# Patient Record
Sex: FEMALE | Race: BLACK OR AFRICAN AMERICAN | Employment: FULL TIME | URBAN - METROPOLITAN AREA
[De-identification: names, ages, dates, MRNs, and addresses within clinical notes are randomized per-mention and may not be internally consistent; named-entity substitution may affect disease eponyms.]

---

## 2017-01-04 ENCOUNTER — OFFICE VISIT (OUTPATIENT)
Dept: FAMILY MEDICINE CLINIC | Facility: CLINIC | Age: 42
End: 2017-01-04

## 2017-01-04 ENCOUNTER — TELEPHONE (OUTPATIENT)
Dept: FAMILY MEDICINE CLINIC | Facility: CLINIC | Age: 42
End: 2017-01-04

## 2017-01-04 DIAGNOSIS — J01.01 ACUTE RECURRENT MAXILLARY SINUSITIS: Primary | ICD-10-CM

## 2017-01-04 DIAGNOSIS — E04.2 NONTOXIC MULTINODULAR GOITER: ICD-10-CM

## 2017-01-04 DIAGNOSIS — J30.1 SEASONAL ALLERGIC RHINITIS DUE TO POLLEN: ICD-10-CM

## 2017-01-04 DIAGNOSIS — G43.009 MIGRAINE WITHOUT AURA AND WITHOUT STATUS MIGRAINOSUS, NOT INTRACTABLE: ICD-10-CM

## 2017-01-04 PROCEDURE — 99214 OFFICE O/P EST MOD 30 MIN: CPT | Performed by: FAMILY MEDICINE

## 2017-01-04 RX ORDER — MONTELUKAST SODIUM 10 MG/1
10 TABLET ORAL DAILY
Qty: 30 TABLET | Refills: 1 | Status: SHIPPED | OUTPATIENT
Start: 2017-01-04 | End: 2017-02-03

## 2017-01-04 RX ORDER — CEFUROXIME AXETIL 500 MG/1
500 TABLET ORAL 2 TIMES DAILY
Qty: 20 TABLET | Refills: 0 | Status: SHIPPED | OUTPATIENT
Start: 2017-01-04 | End: 2017-01-14

## 2017-01-04 NOTE — TELEPHONE ENCOUNTER
Patient c/o new sinus infection that  Started this Friday , she is having sinus pressure, throat pain,running nose no fever, but has been taking ibuprofen for throat pain, she was seen this for this same symptom in November, she is asking if  can call in

## 2017-01-05 NOTE — PROGRESS NOTES
Katheryn Forrest is a 35year old female. HPI:  Has been feeling sick for 4-5 days. Sinus congestion/sinus pressure and headaches. sxs similar to last sinus infection about 2 months ago.  Did get better after last ov w/ oral abx course and then sxs recurre exertion  GI: denies abdominal pain and denies heartburn  : normal bladder  NEURO: denies headaches, denies dizziness    EXAM:  /62 mmHg  Pulse 82  Temp(Src) 97.8 °F (36.6 °C) (Oral)  Resp 16  Ht 62\"  Wt 161 lb  BMI 29.44 kg/m2  SpO2 98%  LMP 12/3 poss triggers, sleep hygiene  OTC meds prn help

## 2017-01-07 VITALS
BODY MASS INDEX: 29.63 KG/M2 | RESPIRATION RATE: 16 BRPM | HEIGHT: 62 IN | OXYGEN SATURATION: 98 % | SYSTOLIC BLOOD PRESSURE: 100 MMHG | WEIGHT: 161 LBS | TEMPERATURE: 98 F | DIASTOLIC BLOOD PRESSURE: 62 MMHG | HEART RATE: 82 BPM

## 2017-01-07 PROBLEM — G43.009 MIGRAINE WITHOUT AURA AND WITHOUT STATUS MIGRAINOSUS, NOT INTRACTABLE: Status: ACTIVE | Noted: 2017-01-07

## 2017-01-20 ENCOUNTER — TELEPHONE (OUTPATIENT)
Dept: FAMILY MEDICINE CLINIC | Facility: CLINIC | Age: 42
End: 2017-01-20

## 2017-01-20 NOTE — TELEPHONE ENCOUNTER
Left voice message for patient regarding need for more information on what her symptoms are, advised that clinic is closing and if her symptoms do not improve to seek urgent care.

## 2017-01-20 NOTE — TELEPHONE ENCOUNTER
Pt wants to speak to nurse. She wants nurse to know that she has Thyroid issues & wants to know if her thyroid issue could be playing a part into why she is not getting better. Pt is going back into work. Ok to leave a voicemail.  Did not want to make appo

## 2017-01-23 NOTE — TELEPHONE ENCOUNTER
Spoke with patient regarding her continue throat issues and advised appointment for check up she scheduled appointment for Wednesday with Dr Martin Serrano.

## 2017-01-25 ENCOUNTER — OFFICE VISIT (OUTPATIENT)
Dept: FAMILY MEDICINE CLINIC | Facility: CLINIC | Age: 42
End: 2017-01-25

## 2017-01-25 VITALS
BODY MASS INDEX: 29.22 KG/M2 | HEIGHT: 62 IN | OXYGEN SATURATION: 98 % | DIASTOLIC BLOOD PRESSURE: 62 MMHG | SYSTOLIC BLOOD PRESSURE: 100 MMHG | WEIGHT: 158.81 LBS | RESPIRATION RATE: 16 BRPM | TEMPERATURE: 98 F | HEART RATE: 73 BPM

## 2017-01-25 DIAGNOSIS — E04.2 NONTOXIC MULTINODULAR GOITER: ICD-10-CM

## 2017-01-25 DIAGNOSIS — J30.89 ALLERGIC RHINITIS DUE TO OTHER ALLERGIC TRIGGER, UNSPECIFIED RHINITIS SEASONALITY: ICD-10-CM

## 2017-01-25 DIAGNOSIS — S33.5XXA LUMBAR SPRAIN, INITIAL ENCOUNTER: ICD-10-CM

## 2017-01-25 DIAGNOSIS — H65.91 MIDDLE EAR EFFUSION, RIGHT: Primary | ICD-10-CM

## 2017-01-25 DIAGNOSIS — M77.8 LEFT ELBOW TENDINITIS: ICD-10-CM

## 2017-01-25 DIAGNOSIS — Z00.00 LABORATORY EXAMINATION ORDERED AS PART OF A COMPLETE PHYSICAL EXAMINATION: ICD-10-CM

## 2017-01-25 DIAGNOSIS — J06.9 VIRAL URI: ICD-10-CM

## 2017-01-25 PROCEDURE — 99214 OFFICE O/P EST MOD 30 MIN: CPT | Performed by: FAMILY MEDICINE

## 2017-01-25 NOTE — PROGRESS NOTES
Nehemiah Landeros is a 35year old female. HPI:  Pt states was feeling better after last ov and abx course and but sxs not completely resolved  Took Ceftin course for 10 days, but noted med caused sedation. prefers not to have this abx again due to this SE,                 REVIEW OF SYSTEMS:  GENERAL HEALTH: feels well otherwise, mood is good  SKIN: denies any unusual skin lesions or rashes  RESPIRATORY: denies shortness of breath with exertion, no cough  CARDIOVASCULAR: denies chest pain on exertio daily  Pt does not want any nasal sprays. Discussed intranasal steroid indication, should help ear effusions, but pt had SEs w/ Flonase in past and does not want any intranasal meds    4.  Nontoxic multinodular goiter  Pt w/ symptomatic increase in thyroid

## 2017-01-31 ENCOUNTER — TELEPHONE (OUTPATIENT)
Dept: FAMILY MEDICINE CLINIC | Facility: CLINIC | Age: 42
End: 2017-01-31

## 2017-01-31 DIAGNOSIS — M54.50 ACUTE BILATERAL LOW BACK PAIN WITHOUT SCIATICA: Primary | ICD-10-CM

## 2017-01-31 DIAGNOSIS — M25.522 ELBOW PAIN, LEFT: ICD-10-CM

## 2017-01-31 NOTE — TELEPHONE ENCOUNTER
Pt called asking to speak to nurse in regard to new med (anti-inflammatory) just was just given. Pt said it is not working.

## 2017-02-01 NOTE — TELEPHONE ENCOUNTER
Patient states the back pain is pretty much around the clock, mostly when she moves a certain way, and still has elbow pain, but is not worse, she has been taking ibuprofen 6-8, she use brace sometimes but admits to not following 's advice regarding her

## 2017-02-01 NOTE — TELEPHONE ENCOUNTER
Please triage regarding pt's symptoms. Saw her for multiple issues at last ov. Diclofenac was for back and elbow pain. How are sinus sxs? How are her joint symptoms, what is still hurting?, ? Better, ? Any worse.  Is she needing meds pretty much around the

## 2017-02-01 NOTE — TELEPHONE ENCOUNTER
Please let pt know that she needs to follow advice re: back and elbow pain tx w/ local ice/heat, exercises   Can cont Ibuprofen as needed with food, but if notes any GI SEs should stop medication. I entered an order for back xray and elbow xray.    If she

## 2017-02-01 NOTE — TELEPHONE ENCOUNTER
Patient states that the diclofenac did not work she states she started taking ibuprofen again, 400 mg every 6 hours, she stated she did not want anything that would make her drowsy or put her to sleep, She stated the iburofen does seem to work better, is t

## 2017-03-31 ENCOUNTER — HOSPITAL ENCOUNTER (OUTPATIENT)
Dept: ULTRASOUND IMAGING | Age: 42
Discharge: HOME | End: 2017-03-31
Attending: FAMILY MEDICINE
Payer: COMMERCIAL

## 2017-03-31 DIAGNOSIS — E04.2 NONTOXIC MULTINODULAR GOITER: ICD-10-CM

## 2017-03-31 PROCEDURE — 76536 US EXAM OF HEAD AND NECK: CPT

## 2017-04-01 ENCOUNTER — LAB ENCOUNTER (OUTPATIENT)
Dept: LAB | Age: 42
End: 2017-04-01
Attending: FAMILY MEDICINE
Payer: COMMERCIAL

## 2017-04-01 DIAGNOSIS — Z00.00 LABORATORY EXAMINATION ORDERED AS PART OF A COMPLETE PHYSICAL EXAMINATION: ICD-10-CM

## 2017-04-01 DIAGNOSIS — E04.2 NONTOXIC MULTINODULAR GOITER: ICD-10-CM

## 2017-04-01 PROCEDURE — 82306 VITAMIN D 25 HYDROXY: CPT

## 2017-04-01 PROCEDURE — 36415 COLL VENOUS BLD VENIPUNCTURE: CPT

## 2017-04-01 PROCEDURE — 84439 ASSAY OF FREE THYROXINE: CPT

## 2017-04-01 PROCEDURE — 80061 LIPID PANEL: CPT

## 2017-04-01 PROCEDURE — 85025 COMPLETE CBC W/AUTO DIFF WBC: CPT

## 2017-04-01 PROCEDURE — 84443 ASSAY THYROID STIM HORMONE: CPT

## 2017-04-01 PROCEDURE — 80053 COMPREHEN METABOLIC PANEL: CPT

## 2017-04-07 ENCOUNTER — TELEPHONE (OUTPATIENT)
Dept: FAMILY MEDICINE CLINIC | Facility: CLINIC | Age: 42
End: 2017-04-07

## 2017-04-07 DIAGNOSIS — E04.2 MULTINODULAR GOITER: ICD-10-CM

## 2017-04-07 DIAGNOSIS — D72.818 OTHER DECREASED WHITE BLOOD CELL (WBC) COUNT: Primary | ICD-10-CM

## 2017-04-07 RX ORDER — ERGOCALCIFEROL 1.25 MG/1
50000 CAPSULE ORAL WEEKLY
Qty: 4 CAPSULE | Refills: 2 | Status: SHIPPED | OUTPATIENT
Start: 2017-04-07 | End: 2017-05-07

## 2017-04-07 NOTE — TELEPHONE ENCOUNTER
Pt notified of labs and u/s results when she was in office w/ her dtr this AM.  WBC low at 2.8. Hb and Plt counts normal. Advised f/u w/ Hematology. , pt states she was not fasting, wnl. Lipids w/ normal TG and LDL. HDL sl low at 39.  Exercise regula

## 2017-04-07 NOTE — TELEPHONE ENCOUNTER
Left voice message for patient per hippa that prescriptions were sent to pharmacy, given telephone numbers for referral along with physicians names, and that she is due for a physical next month, to schedule with Dr Don Godfrey.

## 2017-10-16 ENCOUNTER — OFFICE VISIT (OUTPATIENT)
Dept: FAMILY MEDICINE CLINIC | Facility: CLINIC | Age: 42
End: 2017-10-16

## 2017-10-16 VITALS
WEIGHT: 160.25 LBS | TEMPERATURE: 98 F | SYSTOLIC BLOOD PRESSURE: 106 MMHG | OXYGEN SATURATION: 99 % | HEIGHT: 62 IN | HEART RATE: 84 BPM | BODY MASS INDEX: 29.49 KG/M2 | DIASTOLIC BLOOD PRESSURE: 68 MMHG | RESPIRATION RATE: 16 BRPM

## 2017-10-16 DIAGNOSIS — J02.8 ACUTE PHARYNGITIS DUE TO OTHER SPECIFIED ORGANISMS: Primary | ICD-10-CM

## 2017-10-16 PROCEDURE — 99213 OFFICE O/P EST LOW 20 MIN: CPT | Performed by: FAMILY MEDICINE

## 2017-10-16 RX ORDER — AMOXICILLIN 875 MG/1
875 TABLET, COATED ORAL 2 TIMES DAILY
Qty: 20 TABLET | Refills: 0 | Status: SHIPPED | OUTPATIENT
Start: 2017-10-16 | End: 2017-10-26

## 2017-10-17 NOTE — PROGRESS NOTES
Patient presents with:  Sore Throat: 1 week .  ibuprofen / cold meds   Ear Pain: R ear  Chest Congestion  Sinus Problem      HPI:   Katheryn Forrest is a 35year old female came here with a complaint of cough and cold, for few days  Patient has been having Maternal Grandmother      70's   • Diabetes Paternal Grandfather    • Other[other] [OTHER] Paternal Grandfather    • lung cancer[other] [OTHER] Maternal Grandfather    • Asthma Other       Smoking status: Never Smoker for this Visit:  Signed Prescriptions Disp Refills    amoxicillin 875 MG Oral Tab 20 tablet 0      Sig: Take 1 tablet (875 mg total) by mouth 2 (two) times daily.            Imaging & Consults:  None

## 2018-02-28 ENCOUNTER — OFFICE VISIT (OUTPATIENT)
Dept: FAMILY MEDICINE CLINIC | Facility: CLINIC | Age: 43
End: 2018-02-28

## 2018-02-28 DIAGNOSIS — N63.20 LEFT BREAST MASS: ICD-10-CM

## 2018-02-28 DIAGNOSIS — N60.11 FIBROCYSTIC BREAST CHANGES OF BOTH BREASTS: ICD-10-CM

## 2018-02-28 DIAGNOSIS — R13.10 DYSPHAGIA, UNSPECIFIED TYPE: ICD-10-CM

## 2018-02-28 DIAGNOSIS — N60.12 FIBROCYSTIC BREAST CHANGES OF BOTH BREASTS: ICD-10-CM

## 2018-02-28 DIAGNOSIS — Z00.00 LABORATORY EXAMINATION ORDERED AS PART OF A COMPLETE PHYSICAL EXAMINATION: ICD-10-CM

## 2018-02-28 DIAGNOSIS — E55.9 VITAMIN D DEFICIENCY: ICD-10-CM

## 2018-02-28 DIAGNOSIS — Z00.00 ROUTINE PHYSICAL EXAMINATION: Primary | ICD-10-CM

## 2018-02-28 DIAGNOSIS — E04.2 NONTOXIC MULTINODULAR GOITER: ICD-10-CM

## 2018-02-28 DIAGNOSIS — D72.819 LEUKOPENIA, UNSPECIFIED TYPE: ICD-10-CM

## 2018-02-28 PROCEDURE — 99396 PREV VISIT EST AGE 40-64: CPT | Performed by: FAMILY MEDICINE

## 2018-02-28 NOTE — PROGRESS NOTES
Polo Mojica is a 38year old female. HPI:  Pt is here for physical  Has been more sedentary and not compliant w/ healthy eating habits. Some weight gain due to this.   States she is planning to start exercising regularly  Recent URI w/ exposure to ot • Nontoxic multinodular goiter     s/p thyroid bx 2005 & 10/2008, both benign    • Osteoarthrosis, unspecified whether generalized or localized, unspecified site     Right Knee, Spine   • Other chest pain    • Unspecified disorder of thyroid 9/2014    MN murmur  GI: NABS, soft, no tenderness, no masses, no HSM, ND  EXTREMITIES: no cyanosis, clubbing or edema  NEURO: A&O x3, no focal deficits  Normal gait  Breasts: Bilateral dense fibrocystic changes bilaterally, no focal dominant masses noted.   No nipple o Future    6. Dysphagia, unspecified type  - US THYROID ([id number]); Future    7. Leukopenia, unspecified type  Chronic, benign. Monitor  Also needs f/u w/ Hematology, states will consider after labs  - CBC WITH DIFFERENTIAL WITH PLATELET; Future    8.  Nydia

## 2018-03-08 VITALS
RESPIRATION RATE: 18 BRPM | WEIGHT: 164 LBS | SYSTOLIC BLOOD PRESSURE: 112 MMHG | HEIGHT: 62 IN | HEART RATE: 90 BPM | DIASTOLIC BLOOD PRESSURE: 60 MMHG | BODY MASS INDEX: 30.18 KG/M2 | TEMPERATURE: 99 F | OXYGEN SATURATION: 100 %

## 2018-03-17 ENCOUNTER — HOSPITAL ENCOUNTER (OUTPATIENT)
Dept: ULTRASOUND IMAGING | Age: 43
Discharge: HOME | End: 2018-03-17
Attending: FAMILY MEDICINE
Payer: COMMERCIAL

## 2018-03-17 DIAGNOSIS — E04.2 NONTOXIC MULTINODULAR GOITER: ICD-10-CM

## 2018-03-17 DIAGNOSIS — R13.10 DYSPHAGIA, UNSPECIFIED TYPE: ICD-10-CM

## 2018-03-17 PROCEDURE — 76536 US EXAM OF HEAD AND NECK: CPT | Performed by: FAMILY MEDICINE

## 2018-04-02 ENCOUNTER — TELEPHONE (OUTPATIENT)
Dept: LAB | Age: 43
End: 2018-04-02

## 2018-04-02 DIAGNOSIS — E04.2 NONTOXIC MULTINODULAR GOITER: Primary | ICD-10-CM

## 2018-04-02 NOTE — TELEPHONE ENCOUNTER
----- Message from Génesis Uribe MD sent at 4/2/2018  1:02 PM CDT -----  Please notify patient her thyroid ultrasound shows enlarged gland with numerous nodules, overall not significantly changed since prior exam.  Patient has symptomatic goiter, and ne

## 2018-04-04 NOTE — TELEPHONE ENCOUNTER
Left detailed  message on answering machine per pt request with test results and contact info for Dr Guillermina Morataya [phone]. Also Left message on answering machine to get fasting labs done and to schedule appt to review test results. [facility]

## 2018-04-04 NOTE — TELEPHONE ENCOUNTER
Pt called for test results, said she will be at work and it is okay to leave a voicemail with the results

## 2018-04-20 ENCOUNTER — LAB ENCOUNTER (OUTPATIENT)
Dept: LAB | Age: 43
End: 2018-04-20
Attending: FAMILY MEDICINE
Payer: COMMERCIAL

## 2018-04-20 DIAGNOSIS — D72.819 LEUKOPENIA, UNSPECIFIED TYPE: ICD-10-CM

## 2018-04-20 DIAGNOSIS — Z00.00 LABORATORY EXAMINATION ORDERED AS PART OF A COMPLETE PHYSICAL EXAMINATION: ICD-10-CM

## 2018-04-20 DIAGNOSIS — E04.2 NONTOXIC MULTINODULAR GOITER: ICD-10-CM

## 2018-04-20 DIAGNOSIS — E55.9 VITAMIN D DEFICIENCY: ICD-10-CM

## 2018-04-20 PROCEDURE — 82306 VITAMIN D 25 HYDROXY: CPT

## 2018-04-20 PROCEDURE — 80061 LIPID PANEL: CPT

## 2018-04-20 PROCEDURE — 80050 GENERAL HEALTH PANEL: CPT

## 2018-04-20 PROCEDURE — 84439 ASSAY OF FREE THYROXINE: CPT

## 2018-04-20 PROCEDURE — 36415 COLL VENOUS BLD VENIPUNCTURE: CPT

## 2018-04-20 PROCEDURE — 80053 COMPREHEN METABOLIC PANEL: CPT

## 2018-04-20 PROCEDURE — 83036 HEMOGLOBIN GLYCOSYLATED A1C: CPT

## 2018-04-20 PROCEDURE — 85025 COMPLETE CBC W/AUTO DIFF WBC: CPT

## 2018-05-07 ENCOUNTER — TELEPHONE (OUTPATIENT)
Dept: FAMILY MEDICINE CLINIC | Facility: CLINIC | Age: 43
End: 2018-05-07

## 2018-05-31 NOTE — TELEPHONE ENCOUNTER
Pt was to f/u after labs to discuss results  ( Patient also needs to get her fasting labs done then follow-up in office to review results) noted on result note from thyroid u/s imaging.     Can notify pt labs w/ normal thyroid testing; lipids elevated vs pr

## 2018-06-06 ENCOUNTER — TELEPHONE (OUTPATIENT)
Dept: FAMILY MEDICINE CLINIC | Facility: CLINIC | Age: 43
End: 2018-06-06

## 2018-06-06 DIAGNOSIS — E87.6 HYPOKALEMIA: Primary | ICD-10-CM

## 2018-06-06 NOTE — TELEPHONE ENCOUNTER
Pt came in office said she wants to know why she has to do f/u for labs, said she still has not had her f/u appt with [facility] wants to know why it took so long to get a call back re: results.  Pt said she is not able to contact anyone to schedule appt I

## 2018-06-06 NOTE — TELEPHONE ENCOUNTER
Sent letter with test results and all of Dr Astrid Hardwick notes regarding F/U to discuss labs and F/U thyroid

## 2018-06-06 NOTE — TELEPHONE ENCOUNTER
Pt lmsg on our vm to get lab results, called pt back per previous msg to inform office visit needed for lab f/u no answer left vm

## 2018-06-07 NOTE — TELEPHONE ENCOUNTER
s/w pt   Advised on recs for f/u appt to discuss results.  Pt states was unable to schedule appt and would like to review on phone  s/w pt,  labs w/ normal thyroid testing; lipids elevated vs previous  Fasting blood sugar normal but A1C for 3 mo BS average

## 2018-06-21 ENCOUNTER — HOSPITAL ENCOUNTER (OUTPATIENT)
Dept: ULTRASOUND IMAGING | Age: 43
Discharge: HOME | End: 2018-06-21
Attending: FAMILY MEDICINE
Payer: COMMERCIAL

## 2018-06-21 ENCOUNTER — MED REC SCAN ONLY (OUTPATIENT)
Dept: FAMILY MEDICINE CLINIC | Facility: CLINIC | Age: 43
End: 2018-06-21

## 2018-06-21 ENCOUNTER — HOSPITAL ENCOUNTER (OUTPATIENT)
Dept: MAMMOGRAPHY | Age: 43
Discharge: HOME | End: 2018-06-21
Attending: FAMILY MEDICINE
Payer: COMMERCIAL

## 2018-06-21 DIAGNOSIS — N60.11 FIBROCYSTIC BREAST CHANGES OF BOTH BREASTS: ICD-10-CM

## 2018-06-21 DIAGNOSIS — N60.12 FIBROCYSTIC BREAST CHANGES OF BOTH BREASTS: ICD-10-CM

## 2018-06-21 DIAGNOSIS — N63.20 LEFT BREAST MASS: ICD-10-CM

## 2018-06-21 PROCEDURE — 77062 BREAST TOMOSYNTHESIS BI: CPT | Performed by: FAMILY MEDICINE

## 2018-06-21 PROCEDURE — 76641 ULTRASOUND BREAST COMPLETE: CPT | Performed by: FAMILY MEDICINE

## 2018-06-21 PROCEDURE — 77066 DX MAMMO INCL CAD BI: CPT | Performed by: FAMILY MEDICINE

## 2018-07-02 ENCOUNTER — TELEPHONE (OUTPATIENT)
Dept: FAMILY MEDICINE CLINIC | Facility: CLINIC | Age: 43
End: 2018-07-02

## 2018-07-02 NOTE — TELEPHONE ENCOUNTER
Pt called stating she is returning nurse call for test results. Pt asked if nurse leaves her another message to leave her direct # again. Pt said she deleted the original message from nurse.

## 2018-07-03 NOTE — TELEPHONE ENCOUNTER
Notes recorded by Yoni Parra RN on 7/2/2018 at 4:11 PM CDT  Returned call to patient and discussed mammogram findings. Linda Nava will call back to schedule appointment in three months.  Declined to schedule appt. Today.

## 2018-09-26 ENCOUNTER — OFFICE VISIT (OUTPATIENT)
Dept: FAMILY MEDICINE CLINIC | Facility: CLINIC | Age: 43
End: 2018-09-26

## 2018-09-26 VITALS
TEMPERATURE: 98 F | WEIGHT: 162.25 LBS | RESPIRATION RATE: 16 BRPM | HEIGHT: 62 IN | SYSTOLIC BLOOD PRESSURE: 110 MMHG | DIASTOLIC BLOOD PRESSURE: 62 MMHG | BODY MASS INDEX: 29.86 KG/M2 | OXYGEN SATURATION: 98 % | HEART RATE: 82 BPM

## 2018-09-26 DIAGNOSIS — J06.9 VIRAL UPPER RESPIRATORY TRACT INFECTION: ICD-10-CM

## 2018-09-26 DIAGNOSIS — G43.009 MIGRAINE WITHOUT AURA AND WITHOUT STATUS MIGRAINOSUS, NOT INTRACTABLE: ICD-10-CM

## 2018-09-26 DIAGNOSIS — J30.1 SEASONAL ALLERGIC RHINITIS DUE TO POLLEN: ICD-10-CM

## 2018-09-26 DIAGNOSIS — J02.9 SORE THROAT: Primary | ICD-10-CM

## 2018-09-26 DIAGNOSIS — E04.2 NONTOXIC MULTINODULAR GOITER: ICD-10-CM

## 2018-09-26 PROCEDURE — 99214 OFFICE O/P EST MOD 30 MIN: CPT | Performed by: FAMILY MEDICINE

## 2018-09-26 PROCEDURE — 87880 STREP A ASSAY W/OPTIC: CPT | Performed by: FAMILY MEDICINE

## 2018-09-26 RX ORDER — AMOXICILLIN 875 MG/1
875 TABLET, COATED ORAL 2 TIMES DAILY
Qty: 20 TABLET | Refills: 0 | Status: SHIPPED | OUTPATIENT
Start: 2018-09-26 | End: 2018-10-06

## 2018-09-26 NOTE — PROGRESS NOTES
Abdulaziz Pulido is a 38year old female. HPI:  R ear pain and sore throat started today and seems to be getting worse.   Generalized headache  Pressure behind R eye  Is on her menses so this could be related to migraines but feels different than migraine of breath with exertion  CARDIOVASCULAR: denies chest pain on exertion  GI: denies abdominal pain   : normal bladder  NEURO: denies dizziness    EXAM:  /62   Pulse 82   Temp 98.4 °F (36.9 °C) (Temporal)   Resp 16   Ht 62\"   Wt 162 lb 4 oz   SpO2 9 intractable  Patient will take over-the-counter analgesics as needed. Sleep will help. Defers prescription pain medicines. 5. Nontoxic multinodular goiter  Did have follow-up with ENT a few months ago. This is being monitored.   To have thyroid ultras

## 2018-11-13 ENCOUNTER — TELEPHONE (OUTPATIENT)
Dept: FAMILY MEDICINE CLINIC | Facility: CLINIC | Age: 43
End: 2018-11-13

## 2018-11-13 ENCOUNTER — OFFICE VISIT (OUTPATIENT)
Dept: FAMILY MEDICINE CLINIC | Facility: CLINIC | Age: 43
End: 2018-11-13

## 2018-11-13 VITALS — BODY MASS INDEX: 30 KG/M2 | WEIGHT: 163 LBS | RESPIRATION RATE: 16 BRPM | HEIGHT: 62 IN | HEART RATE: 90 BPM

## 2018-11-13 DIAGNOSIS — J30.1 SEASONAL ALLERGIC RHINITIS DUE TO POLLEN: ICD-10-CM

## 2018-11-13 DIAGNOSIS — J01.81 OTHER ACUTE RECURRENT SINUSITIS: Primary | ICD-10-CM

## 2018-11-13 PROCEDURE — 99213 OFFICE O/P EST LOW 20 MIN: CPT | Performed by: FAMILY MEDICINE

## 2018-11-13 RX ORDER — PREDNISONE 20 MG/1
20 TABLET ORAL DAILY
Qty: 5 TABLET | Refills: 0 | Status: SHIPPED | OUTPATIENT
Start: 2018-11-13 | End: 2018-11-18

## 2018-11-13 RX ORDER — CEFUROXIME AXETIL 500 MG/1
500 TABLET ORAL 2 TIMES DAILY
Qty: 20 TABLET | Refills: 0 | Status: SHIPPED | OUTPATIENT
Start: 2018-11-13 | End: 2018-11-23

## 2018-11-13 NOTE — PROGRESS NOTES
Elba He is a 38year old female. HPI:  Patient complains of worsening bilateral ear pain, sore throat, cough for the last few days. Was significantly improved after 9/26/18 office visit. Did take 10-day course of amoxicillin for sinusitis.   Sym MARROW BIOPSY  207    benign   • D&C AFTER DELIVERY  1991    for ectopic- right tube removed   • HEMORRHOIDECTOMY  9/6/2013    Procedure: HEMORRHOIDECTOMY;  Surgeon: José Miguel Wilkinson MD;  Location: [facility] MAIN OR   • HEMORRHOIDECTOMY N/A 9/6/2013    Performed by Maria C Bee, Axetil (CEFTIN) 500 MG Oral Tab; Take 1 tablet (500 mg total) by mouth 2 (two) times daily for 10 days. Dispense: 20 tablet; Refill: 0  Advised Flonase intranasal spray. Reviewed indication.   Patient does not wish to use this, as had side effect of weigh

## 2018-11-13 NOTE — TELEPHONE ENCOUNTER
Would like Dr. Dolores Bailey to call her. Needed to leave to return to work. Had a 12:45 appt. Left office at 1:26 pm.  Co-pay was refunded for appt. Pls Call:  [phone].

## 2018-11-16 ENCOUNTER — TELEPHONE (OUTPATIENT)
Dept: FAMILY MEDICINE CLINIC | Facility: CLINIC | Age: 43
End: 2018-11-16

## 2018-11-16 NOTE — TELEPHONE ENCOUNTER
LVM for patient to give office a call back for condtion update also apologized again for the experience that she had in the office.

## 2019-02-01 ENCOUNTER — TELEPHONE (OUTPATIENT)
Dept: FAMILY MEDICINE CLINIC | Facility: CLINIC | Age: 44
End: 2019-02-01

## 2019-02-01 NOTE — TELEPHONE ENCOUNTER
Pt hit her head on cabinet said she felt sharp pain go down neck and can feel muscle spasm in center of back and neck. Wants to know what she can take for pain?

## 2019-02-01 NOTE — TELEPHONE ENCOUNTER
Head hurts but back mostly hurts. Says she did not hit her back just her head. Thinks her back is in spasm. Did not lose consciousness. Says OTC Ibuprofen and tylenol is not helping. Advised to go to IC or ER. She can get X-rays if needed and pain Rx.   Ca

## 2019-04-30 ENCOUNTER — TELEPHONE (OUTPATIENT)
Dept: FAMILY MEDICINE CLINIC | Facility: CLINIC | Age: 44
End: 2019-04-30

## 2019-04-30 NOTE — TELEPHONE ENCOUNTER
Spoke to patient. Advised there is no blanket recommendation from the CDC to vaccinate everyone who was born prior to [birth year] with a measles vaccine. Explained there are some cases where vaccination or re-vaccination needs to be considered for some adults.

## 2019-04-30 NOTE — TELEPHONE ENCOUNTER
Pt called stating she heard on the news that anyone born before 1988 needs to be re-vaccinated for Measles. Please advise.

## 2019-07-24 ENCOUNTER — OFFICE VISIT (OUTPATIENT)
Dept: FAMILY MEDICINE CLINIC | Facility: CLINIC | Age: 44
End: 2019-07-24

## 2019-07-24 VITALS
DIASTOLIC BLOOD PRESSURE: 66 MMHG | WEIGHT: 172 LBS | BODY MASS INDEX: 31.65 KG/M2 | RESPIRATION RATE: 16 BRPM | SYSTOLIC BLOOD PRESSURE: 110 MMHG | HEIGHT: 62 IN | TEMPERATURE: 98 F | HEART RATE: 80 BPM

## 2019-07-24 DIAGNOSIS — E04.2 NONTOXIC MULTINODULAR GOITER: ICD-10-CM

## 2019-07-24 DIAGNOSIS — Z00.00 LABORATORY EXAMINATION ORDERED AS PART OF A COMPLETE PHYSICAL EXAMINATION: ICD-10-CM

## 2019-07-24 DIAGNOSIS — N76.3 CHRONIC VULVITIS: ICD-10-CM

## 2019-07-24 DIAGNOSIS — Z12.4 SCREENING FOR CERVICAL CANCER: ICD-10-CM

## 2019-07-24 DIAGNOSIS — J30.1 SEASONAL ALLERGIC RHINITIS DUE TO POLLEN: ICD-10-CM

## 2019-07-24 DIAGNOSIS — G43.009 MIGRAINE WITHOUT AURA AND WITHOUT STATUS MIGRAINOSUS, NOT INTRACTABLE: ICD-10-CM

## 2019-07-24 DIAGNOSIS — Z01.419 ROUTINE GYNECOLOGICAL EXAMINATION: Primary | ICD-10-CM

## 2019-07-24 DIAGNOSIS — Z12.31 VISIT FOR SCREENING MAMMOGRAM: ICD-10-CM

## 2019-07-24 PROCEDURE — 99396 PREV VISIT EST AGE 40-64: CPT | Performed by: FAMILY MEDICINE

## 2019-07-24 PROCEDURE — 88175 CYTOPATH C/V AUTO FLUID REDO: CPT | Performed by: FAMILY MEDICINE

## 2019-07-24 PROCEDURE — 87624 HPV HI-RISK TYP POOLED RSLT: CPT | Performed by: FAMILY MEDICINE

## 2019-07-24 RX ORDER — CLOTRIMAZOLE AND BETAMETHASONE DIPROPIONATE 10; .64 MG/G; MG/G
CREAM TOPICAL
Qty: 15 G | Refills: 0 | Status: SHIPPED | OUTPATIENT
Start: 2019-07-24 | End: 2020-02-10

## 2019-07-24 NOTE — PROGRESS NOTES
Denita Bowens is a 37year old female. HPI:  Pt is here for physical/WWE  Has been doing some walking, but not regularly  Eating healthier recently  Wt gain and thinks due to lack of regular healthy diet and regular exercise.   Menses regular, lasting 4 Migraine    • Nontoxic multinodular goiter     s/p thyroid bx 2005 & 10/2008, both benign    • Osteoarthrosis, unspecified whether generalized or localized, unspecified site     Right Knee, Spine   • Other chest pain    • Unspecified disorder of thyroid 9/ Conjunctivae and EOM are normal.   Neck: Normal range of motion. Neck supple. Thyromegaly present. Multinodular goiter, no tenderness   Cardiovascular: Normal rate, regular rhythm and normal heart sounds. No murmur heard.   Pulmonary/Chest: Effort norm Future  - COMP METABOLIC PANEL (14); Future  - LIPID PANEL; Future  - TSH+FREE T4; Future  - HEMOGLOBIN A1C; Future    4. Visit for screening mammogram  - [facility] MARCELINO 2D+3D SCREENING BILAT (CPT=77067/16519); Future    5.  Chronic vulvitis  - clotrimazole-betam

## 2019-07-25 LAB — HPV I/H RISK 1 DNA SPEC QL NAA+PROBE: NEGATIVE

## 2019-08-01 ENCOUNTER — TELEPHONE (OUTPATIENT)
Dept: FAMILY MEDICINE CLINIC | Facility: CLINIC | Age: 44
End: 2019-08-01

## 2019-08-01 NOTE — TELEPHONE ENCOUNTER
Dr. Leroy Shelby,  Please advise    Results are complete- normal    No noted provider comment. OK to notify patient?

## 2019-09-21 ENCOUNTER — LAB ENCOUNTER (OUTPATIENT)
Dept: LAB | Age: 44
End: 2019-09-21
Attending: FAMILY MEDICINE
Payer: COMMERCIAL

## 2019-09-21 DIAGNOSIS — E04.2 NONTOXIC MULTINODULAR GOITER: ICD-10-CM

## 2019-09-21 DIAGNOSIS — Z00.00 LABORATORY EXAMINATION ORDERED AS PART OF A COMPLETE PHYSICAL EXAMINATION: ICD-10-CM

## 2019-09-21 LAB
ALBUMIN SERPL-MCNC: 3.6 G/DL (ref 3.4–5)
ALBUMIN/GLOB SERPL: 1 {RATIO} (ref 1–2)
ALP LIVER SERPL-CCNC: 62 U/L (ref 37–98)
ALT SERPL-CCNC: 16 U/L (ref 13–56)
ANION GAP SERPL CALC-SCNC: 5 MMOL/L (ref 0–18)
AST SERPL-CCNC: 10 U/L (ref 15–37)
BASOPHILS # BLD AUTO: 0.02 X10(3) UL (ref 0–0.2)
BASOPHILS NFR BLD AUTO: 0.5 %
BILIRUB SERPL-MCNC: 0.9 MG/DL (ref 0.1–2)
BUN BLD-MCNC: 11 MG/DL (ref 7–18)
BUN/CREAT SERPL: 11.7 (ref 10–20)
CALCIUM BLD-MCNC: 8.6 MG/DL (ref 8.5–10.1)
CHLORIDE SERPL-SCNC: 107 MMOL/L (ref 98–112)
CHOLEST SMN-MCNC: 181 MG/DL (ref ?–200)
CO2 SERPL-SCNC: 28 MMOL/L (ref 21–32)
CREAT BLD-MCNC: 0.94 MG/DL (ref 0.55–1.02)
DEPRECATED RDW RBC AUTO: 45.1 FL (ref 35.1–46.3)
EOSINOPHIL # BLD AUTO: 0.09 X10(3) UL (ref 0–0.7)
EOSINOPHIL NFR BLD AUTO: 2.3 %
ERYTHROCYTE [DISTWIDTH] IN BLOOD BY AUTOMATED COUNT: 12.5 % (ref 11–15)
EST. AVERAGE GLUCOSE BLD GHB EST-MCNC: 134 MG/DL (ref 68–126)
GLOBULIN PLAS-MCNC: 3.6 G/DL (ref 2.8–4.4)
GLUCOSE BLD-MCNC: 95 MG/DL (ref 70–99)
HBA1C MFR BLD HPLC: 6.3 % (ref ?–5.7)
HCT VFR BLD AUTO: 38.3 % (ref 35–48)
HDLC SERPL-MCNC: 34 MG/DL (ref 40–59)
HGB BLD-MCNC: 13 G/DL (ref 12–16)
IMM GRANULOCYTES # BLD AUTO: 0 X10(3) UL (ref 0–1)
IMM GRANULOCYTES NFR BLD: 0 %
LDLC SERPL CALC-MCNC: 130 MG/DL (ref ?–100)
LYMPHOCYTES # BLD AUTO: 1.53 X10(3) UL (ref 1–4)
LYMPHOCYTES NFR BLD AUTO: 39.8 %
M PROTEIN MFR SERPL ELPH: 7.2 G/DL (ref 6.4–8.2)
MCH RBC QN AUTO: 33.6 PG (ref 26–34)
MCHC RBC AUTO-ENTMCNC: 33.9 G/DL (ref 31–37)
MCV RBC AUTO: 99 FL (ref 80–100)
MONOCYTES # BLD AUTO: 0.38 X10(3) UL (ref 0.1–1)
MONOCYTES NFR BLD AUTO: 9.9 %
NEUTROPHILS # BLD AUTO: 1.82 X10 (3) UL (ref 1.5–7.7)
NEUTROPHILS # BLD AUTO: 1.82 X10(3) UL (ref 1.5–7.7)
NEUTROPHILS NFR BLD AUTO: 47.5 %
NONHDLC SERPL-MCNC: 147 MG/DL (ref ?–130)
OSMOLALITY SERPL CALC.SUM OF ELEC: 289 MOSM/KG (ref 275–295)
PLATELET # BLD AUTO: 230 10(3)UL (ref 150–450)
POTASSIUM SERPL-SCNC: 3.8 MMOL/L (ref 3.5–5.1)
RBC # BLD AUTO: 3.87 X10(6)UL (ref 3.8–5.3)
SODIUM SERPL-SCNC: 140 MMOL/L (ref 136–145)
T4 FREE SERPL-MCNC: 0.8 NG/DL (ref 0.8–1.7)
TRIGL SERPL-MCNC: 86 MG/DL (ref 30–149)
TSI SER-ACNC: 1.37 MIU/ML (ref 0.36–3.74)
VLDLC SERPL CALC-MCNC: 17 MG/DL (ref 0–30)
WBC # BLD AUTO: 3.8 X10(3) UL (ref 4–11)

## 2019-09-21 PROCEDURE — 80053 COMPREHEN METABOLIC PANEL: CPT

## 2019-09-21 PROCEDURE — 84439 ASSAY OF FREE THYROXINE: CPT

## 2019-09-21 PROCEDURE — 80061 LIPID PANEL: CPT

## 2019-09-21 PROCEDURE — 36415 COLL VENOUS BLD VENIPUNCTURE: CPT

## 2019-09-21 PROCEDURE — 84443 ASSAY THYROID STIM HORMONE: CPT

## 2019-09-21 PROCEDURE — 85025 COMPLETE CBC W/AUTO DIFF WBC: CPT

## 2019-09-21 PROCEDURE — 83036 HEMOGLOBIN GLYCOSYLATED A1C: CPT

## 2019-10-01 ENCOUNTER — TELEPHONE (OUTPATIENT)
Dept: FAMILY MEDICINE CLINIC | Facility: CLINIC | Age: 44
End: 2019-10-01

## 2019-10-01 DIAGNOSIS — E87.6 HYPOKALEMIA: Primary | ICD-10-CM

## 2019-10-01 DIAGNOSIS — R73.09 ELEVATED GLUCOSE: ICD-10-CM

## 2019-10-01 DIAGNOSIS — D72.819 LEUKOPENIA, UNSPECIFIED TYPE: ICD-10-CM

## 2019-10-01 NOTE — TELEPHONE ENCOUNTER
Left detailed message with Dr Stephane Pérez notes, advised to call back with any other questions or concerns    Future labs ordered

## 2019-10-01 NOTE — TELEPHONE ENCOUNTER
Notes recorded by Linh Yang LPN on 2/14/9341 at 5:32 PM CDT  Left message for patient to call back.   ------    Notes recorded by Margret Finley MD on 9/25/2019 at 9:21 PM CDT  Please notify patient labs show normal fasting blood sugar, but hemog

## 2019-10-01 NOTE — TELEPHONE ENCOUNTER
Patient called back regarding message left for her , test results, she said if they are normal you can leave a detailed message on her phone otherwise , let her know and she will have to call back when she is done with work .

## 2019-10-05 ENCOUNTER — HOSPITAL ENCOUNTER (OUTPATIENT)
Dept: MAMMOGRAPHY | Age: 44
Discharge: HOME | End: 2019-10-05
Attending: FAMILY MEDICINE
Payer: COMMERCIAL

## 2019-10-05 ENCOUNTER — HOSPITAL ENCOUNTER (OUTPATIENT)
Dept: ULTRASOUND IMAGING | Age: 44
Discharge: HOME | End: 2019-10-05
Attending: OTOLARYNGOLOGY
Payer: COMMERCIAL

## 2019-10-05 DIAGNOSIS — Z12.31 VISIT FOR SCREENING MAMMOGRAM: ICD-10-CM

## 2019-10-05 DIAGNOSIS — E04.2 NONTOXIC MULTINODULAR GOITER: ICD-10-CM

## 2019-10-05 PROCEDURE — 77063 BREAST TOMOSYNTHESIS BI: CPT | Performed by: FAMILY MEDICINE

## 2019-10-05 PROCEDURE — 76536 US EXAM OF HEAD AND NECK: CPT | Performed by: OTOLARYNGOLOGY

## 2019-10-05 PROCEDURE — 77067 SCR MAMMO BI INCL CAD: CPT | Performed by: FAMILY MEDICINE

## 2019-10-10 ENCOUNTER — TELEPHONE (OUTPATIENT)
Dept: FAMILY MEDICINE CLINIC | Facility: CLINIC | Age: 44
End: 2019-10-10

## 2019-10-10 NOTE — TELEPHONE ENCOUNTER
Pt advised of nisa results  States she did not get a letter yet, appreciated the call back      CONCLUSION:     DIAGNOSTIC CATEGORY 2--BENIGN FINDING NO CHANGE FROM COMPARISON ASSESSMENT.        RECOMMENDATIONS:    ROUTINE MAMMOGRAM AND CLINICAL EVALUATION

## 2019-10-18 ENCOUNTER — TELEPHONE (OUTPATIENT)
Dept: FAMILY MEDICINE CLINIC | Facility: CLINIC | Age: 44
End: 2019-10-18

## 2019-10-18 DIAGNOSIS — E04.2 NONTOXIC MULTINODULAR GOITER: Primary | ICD-10-CM

## 2019-10-18 NOTE — TELEPHONE ENCOUNTER
Patient went to see dr Travis Lieberman yesterday the ear noes and throat dr but they said there is no RFL from the dr and I dont see one either the one we do have from the dr has  ??? The patient said they need one sent over asap or she will get the bill f

## 2020-01-28 ENCOUNTER — TELEPHONE (OUTPATIENT)
Dept: FAMILY MEDICINE CLINIC | Facility: CLINIC | Age: 45
End: 2020-01-28

## 2020-01-28 NOTE — TELEPHONE ENCOUNTER
Pt called informing us that she is having surgery with Dr Prasanna Bangura 2/20/20 & needs a pre-op. She will call Dr Funes British office to have them send the Pre-op info. One we receive it an appointment can be scheduled. (Conf. With Clinical nurse) .

## 2020-02-12 NOTE — H&P
[facility]  History & Physical    Manish Arboleda Patient Status:  Outpatient in a Bed    1975 MRN [id number]   [facility] SURGERY Attending Aung Longoria MD   Hosp Day # 0 PCP Essie Guerra MD     History of Present Ill that she does not use drugs. Allergies:    Shellfish-Derived P*    ANAPHYLAXIS  Betadine [Povidone *    RASH  Codeine                 ITCHING    Comment:Itching in throat    Home Medications:  No medications prior to admission.       Physical Exam:   Gen

## 2020-02-18 ENCOUNTER — LAB ENCOUNTER (OUTPATIENT)
Dept: LAB | Age: 45
End: 2020-02-18
Attending: FAMILY MEDICINE
Payer: COMMERCIAL

## 2020-02-18 DIAGNOSIS — R73.09 ELEVATED GLUCOSE: ICD-10-CM

## 2020-02-18 DIAGNOSIS — D72.819 LEUKOPENIA, UNSPECIFIED TYPE: ICD-10-CM

## 2020-02-18 DIAGNOSIS — D75.89 MACROCYTOSIS: ICD-10-CM

## 2020-02-18 DIAGNOSIS — E87.6 HYPOKALEMIA: ICD-10-CM

## 2020-02-18 LAB
ALBUMIN SERPL-MCNC: 3.6 G/DL (ref 3.4–5)
ALBUMIN/GLOB SERPL: 0.9 {RATIO} (ref 1–2)
ALP LIVER SERPL-CCNC: 65 U/L (ref 39–100)
ALT SERPL-CCNC: 19 U/L (ref 13–56)
ANION GAP SERPL CALC-SCNC: 6 MMOL/L (ref 0–18)
AST SERPL-CCNC: 12 U/L (ref 15–37)
BASOPHILS # BLD AUTO: 0.02 X10(3) UL (ref 0–0.2)
BASOPHILS NFR BLD AUTO: 0.6 %
BILIRUB SERPL-MCNC: 0.4 MG/DL (ref 0.1–2)
BUN BLD-MCNC: 10 MG/DL (ref 7–18)
BUN/CREAT SERPL: 10.9 (ref 10–20)
CALCIUM BLD-MCNC: 8.5 MG/DL (ref 8.5–10.1)
CHLORIDE SERPL-SCNC: 109 MMOL/L (ref 98–112)
CO2 SERPL-SCNC: 26 MMOL/L (ref 21–32)
CREAT BLD-MCNC: 0.92 MG/DL (ref 0.55–1.02)
DEPRECATED RDW RBC AUTO: 47.9 FL (ref 35.1–46.3)
EOSINOPHIL # BLD AUTO: 0.06 X10(3) UL (ref 0–0.7)
EOSINOPHIL NFR BLD AUTO: 1.8 %
ERYTHROCYTE [DISTWIDTH] IN BLOOD BY AUTOMATED COUNT: 12.9 % (ref 11–15)
EST. AVERAGE GLUCOSE BLD GHB EST-MCNC: 128 MG/DL (ref 68–126)
GLOBULIN PLAS-MCNC: 3.8 G/DL (ref 2.8–4.4)
GLUCOSE BLD-MCNC: 108 MG/DL (ref 70–99)
HBA1C MFR BLD HPLC: 6.1 % (ref ?–5.7)
HCT VFR BLD AUTO: 39.7 % (ref 35–48)
HGB BLD-MCNC: 12.7 G/DL (ref 12–16)
IMM GRANULOCYTES # BLD AUTO: 0.02 X10(3) UL (ref 0–1)
IMM GRANULOCYTES NFR BLD: 0.6 %
LYMPHOCYTES # BLD AUTO: 1.33 X10(3) UL (ref 1–4)
LYMPHOCYTES NFR BLD AUTO: 40.3 %
M PROTEIN MFR SERPL ELPH: 7.4 G/DL (ref 6.4–8.2)
MCH RBC QN AUTO: 32.7 PG (ref 26–34)
MCHC RBC AUTO-ENTMCNC: 32 G/DL (ref 31–37)
MCV RBC AUTO: 102.3 FL (ref 80–100)
MONOCYTES # BLD AUTO: 0.3 X10(3) UL (ref 0.1–1)
MONOCYTES NFR BLD AUTO: 9.1 %
NEUTROPHILS # BLD AUTO: 1.57 X10 (3) UL (ref 1.5–7.7)
NEUTROPHILS # BLD AUTO: 1.57 X10(3) UL (ref 1.5–7.7)
NEUTROPHILS NFR BLD AUTO: 47.6 %
OSMOLALITY SERPL CALC.SUM OF ELEC: 292 MOSM/KG (ref 275–295)
PATIENT FASTING Y/N/NP: YES
PLATELET # BLD AUTO: 245 10(3)UL (ref 150–450)
POTASSIUM SERPL-SCNC: 3.7 MMOL/L (ref 3.5–5.1)
RBC # BLD AUTO: 3.88 X10(6)UL (ref 3.8–5.3)
SODIUM SERPL-SCNC: 141 MMOL/L (ref 136–145)
WBC # BLD AUTO: 3.3 X10(3) UL (ref 4–11)

## 2020-02-18 PROCEDURE — 82607 VITAMIN B-12: CPT

## 2020-02-18 PROCEDURE — 36415 COLL VENOUS BLD VENIPUNCTURE: CPT

## 2020-02-18 PROCEDURE — 85025 COMPLETE CBC W/AUTO DIFF WBC: CPT

## 2020-02-18 PROCEDURE — 80053 COMPREHEN METABOLIC PANEL: CPT

## 2020-02-18 PROCEDURE — 83036 HEMOGLOBIN GLYCOSYLATED A1C: CPT

## 2020-02-19 DIAGNOSIS — D75.89 MACROCYTOSIS: Primary | ICD-10-CM

## 2020-02-19 LAB — VIT B12 SERPL-MCNC: 342 PG/ML (ref 193–986)

## 2020-02-20 ENCOUNTER — HOSPITAL ENCOUNTER (OUTPATIENT)
Facility: HOSPITAL | Age: 45
Discharge: HOME | End: 2020-02-21
Attending: OTOLARYNGOLOGY | Admitting: OTOLARYNGOLOGY
Payer: COMMERCIAL

## 2020-02-20 ENCOUNTER — ANESTHESIA (OUTPATIENT)
Dept: SURGERY | Facility: HOSPITAL | Age: 45
End: 2020-02-20
Payer: COMMERCIAL

## 2020-02-20 ENCOUNTER — ANESTHESIA EVENT (OUTPATIENT)
Dept: SURGERY | Facility: HOSPITAL | Age: 45
End: 2020-02-20
Payer: COMMERCIAL

## 2020-02-20 LAB
POCT LOT NUMBER: NORMAL
POCT URINE PREGNANCY: NEGATIVE

## 2020-02-20 PROCEDURE — 0GTK0ZZ RESECTION OF THYROID GLAND, OPEN APPROACH: ICD-10-PCS | Performed by: OTOLARYNGOLOGY

## 2020-02-20 PROCEDURE — 81025 URINE PREGNANCY TEST: CPT | Performed by: OTOLARYNGOLOGY

## 2020-02-20 PROCEDURE — 88307 TISSUE EXAM BY PATHOLOGIST: CPT | Performed by: OTOLARYNGOLOGY

## 2020-02-20 PROCEDURE — 0GBJ0ZZ EXCISION OF THYROID GLAND ISTHMUS, OPEN APPROACH: ICD-10-PCS | Performed by: OTOLARYNGOLOGY

## 2020-02-20 PROCEDURE — 96376 TX/PRO/DX INJ SAME DRUG ADON: CPT

## 2020-02-20 RX ORDER — MORPHINE SULFATE 4 MG/ML
8 INJECTION, SOLUTION INTRAMUSCULAR; INTRAVENOUS EVERY 2 HOUR PRN
Status: DISCONTINUED | OUTPATIENT
Start: 2020-02-20 | End: 2020-02-21

## 2020-02-20 RX ORDER — MEPERIDINE HYDROCHLORIDE 25 MG/ML
12.5 INJECTION INTRAMUSCULAR; INTRAVENOUS; SUBCUTANEOUS AS NEEDED
Status: DISCONTINUED | OUTPATIENT
Start: 2020-02-20 | End: 2020-02-20 | Stop reason: HOSPADM

## 2020-02-20 RX ORDER — SODIUM CHLORIDE, SODIUM LACTATE, POTASSIUM CHLORIDE, CALCIUM CHLORIDE 600; 310; 30; 20 MG/100ML; MG/100ML; MG/100ML; MG/100ML
INJECTION, SOLUTION INTRAVENOUS CONTINUOUS
Status: DISCONTINUED | OUTPATIENT
Start: 2020-02-20 | End: 2020-02-20 | Stop reason: HOSPADM

## 2020-02-20 RX ORDER — HYDROCODONE BITARTRATE AND ACETAMINOPHEN 5; 325 MG/1; MG/1
1 TABLET ORAL EVERY 4 HOURS PRN
Status: DISCONTINUED | OUTPATIENT
Start: 2020-02-20 | End: 2020-02-21

## 2020-02-20 RX ORDER — HYDROMORPHONE HYDROCHLORIDE 1 MG/ML
0.4 INJECTION, SOLUTION INTRAMUSCULAR; INTRAVENOUS; SUBCUTANEOUS EVERY 5 MIN PRN
Status: DISCONTINUED | OUTPATIENT
Start: 2020-02-20 | End: 2020-02-20 | Stop reason: HOSPADM

## 2020-02-20 RX ORDER — ACETAMINOPHEN 325 MG/1
650 TABLET ORAL EVERY 4 HOURS PRN
Status: DISCONTINUED | OUTPATIENT
Start: 2020-02-20 | End: 2020-02-21

## 2020-02-20 RX ORDER — HYDROCODONE BITARTRATE AND ACETAMINOPHEN 5; 325 MG/1; MG/1
2 TABLET ORAL EVERY 4 HOURS PRN
Status: DISCONTINUED | OUTPATIENT
Start: 2020-02-20 | End: 2020-02-21

## 2020-02-20 RX ORDER — METOCLOPRAMIDE HYDROCHLORIDE 5 MG/ML
INJECTION INTRAMUSCULAR; INTRAVENOUS
Status: DISPENSED
Start: 2020-02-20 | End: 2020-02-21

## 2020-02-20 RX ORDER — HYDROCODONE BITARTRATE AND ACETAMINOPHEN 5; 325 MG/1; MG/1
2 TABLET ORAL AS NEEDED
Status: DISCONTINUED | OUTPATIENT
Start: 2020-02-20 | End: 2020-02-20 | Stop reason: HOSPADM

## 2020-02-20 RX ORDER — LEVOTHYROXINE SODIUM 0.1 MG/1
100 TABLET ORAL
Status: DISCONTINUED | OUTPATIENT
Start: 2020-02-20 | End: 2020-02-21

## 2020-02-20 RX ORDER — EPHEDRINE SULFATE 50 MG/ML
INJECTION, SOLUTION INTRAVENOUS AS NEEDED
Status: DISCONTINUED | OUTPATIENT
Start: 2020-02-20 | End: 2020-02-20 | Stop reason: SURG

## 2020-02-20 RX ORDER — SODIUM CHLORIDE AND POTASSIUM CHLORIDE .9; .15 G/100ML; G/100ML
SOLUTION INTRAVENOUS CONTINUOUS
Status: DISCONTINUED | OUTPATIENT
Start: 2020-02-20 | End: 2020-02-21

## 2020-02-20 RX ORDER — PHENYLEPHRINE HCL 10 MG/ML
VIAL (ML) INJECTION AS NEEDED
Status: DISCONTINUED | OUTPATIENT
Start: 2020-02-20 | End: 2020-02-20 | Stop reason: SURG

## 2020-02-20 RX ORDER — MIDAZOLAM HYDROCHLORIDE 1 MG/ML
INJECTION INTRAMUSCULAR; INTRAVENOUS AS NEEDED
Status: DISCONTINUED | OUTPATIENT
Start: 2020-02-20 | End: 2020-02-20 | Stop reason: SURG

## 2020-02-20 RX ORDER — CALCITRIOL 0.25 UG/1
0.25 CAPSULE, LIQUID FILLED ORAL DAILY
Status: DISCONTINUED | OUTPATIENT
Start: 2020-02-20 | End: 2020-02-21

## 2020-02-20 RX ORDER — METOCLOPRAMIDE HYDROCHLORIDE 5 MG/ML
10 INJECTION INTRAMUSCULAR; INTRAVENOUS EVERY 4 HOURS PRN
Status: DISCONTINUED | OUTPATIENT
Start: 2020-02-20 | End: 2020-02-21

## 2020-02-20 RX ORDER — ONDANSETRON 2 MG/ML
INJECTION INTRAMUSCULAR; INTRAVENOUS AS NEEDED
Status: DISCONTINUED | OUTPATIENT
Start: 2020-02-20 | End: 2020-02-20 | Stop reason: SURG

## 2020-02-20 RX ORDER — HYDROCODONE BITARTRATE AND ACETAMINOPHEN 5; 325 MG/1; MG/1
1 TABLET ORAL AS NEEDED
Status: DISCONTINUED | OUTPATIENT
Start: 2020-02-20 | End: 2020-02-20 | Stop reason: HOSPADM

## 2020-02-20 RX ORDER — MORPHINE SULFATE 4 MG/ML
2 INJECTION, SOLUTION INTRAMUSCULAR; INTRAVENOUS EVERY 2 HOUR PRN
Status: DISCONTINUED | OUTPATIENT
Start: 2020-02-20 | End: 2020-02-21

## 2020-02-20 RX ORDER — MORPHINE SULFATE 4 MG/ML
4 INJECTION, SOLUTION INTRAMUSCULAR; INTRAVENOUS EVERY 2 HOUR PRN
Status: DISCONTINUED | OUTPATIENT
Start: 2020-02-20 | End: 2020-02-21

## 2020-02-20 RX ORDER — ONDANSETRON 2 MG/ML
4 INJECTION INTRAMUSCULAR; INTRAVENOUS EVERY 6 HOURS PRN
Status: DISCONTINUED | OUTPATIENT
Start: 2020-02-20 | End: 2020-02-21

## 2020-02-20 RX ORDER — DEXAMETHASONE SODIUM PHOSPHATE 4 MG/ML
VIAL (ML) INJECTION AS NEEDED
Status: DISCONTINUED | OUTPATIENT
Start: 2020-02-20 | End: 2020-02-20 | Stop reason: SURG

## 2020-02-20 RX ORDER — CALCIUM CARBONATE 200(500)MG
1500 TABLET,CHEWABLE ORAL
Status: DISCONTINUED | OUTPATIENT
Start: 2020-02-20 | End: 2020-02-21

## 2020-02-20 RX ORDER — ONDANSETRON 2 MG/ML
4 INJECTION INTRAMUSCULAR; INTRAVENOUS AS NEEDED
Status: DISCONTINUED | OUTPATIENT
Start: 2020-02-20 | End: 2020-02-20 | Stop reason: HOSPADM

## 2020-02-20 RX ORDER — NALOXONE HYDROCHLORIDE 0.4 MG/ML
80 INJECTION, SOLUTION INTRAMUSCULAR; INTRAVENOUS; SUBCUTANEOUS AS NEEDED
Status: DISCONTINUED | OUTPATIENT
Start: 2020-02-20 | End: 2020-02-20 | Stop reason: HOSPADM

## 2020-02-20 RX ORDER — ZOLPIDEM TARTRATE 5 MG/1
5 TABLET ORAL NIGHTLY PRN
Status: DISCONTINUED | OUTPATIENT
Start: 2020-02-20 | End: 2020-02-21

## 2020-02-20 RX ORDER — ACETAMINOPHEN 500 MG
1000 TABLET ORAL ONCE
Status: DISCONTINUED | OUTPATIENT
Start: 2020-02-20 | End: 2020-02-20

## 2020-02-20 RX ORDER — DIPHENHYDRAMINE HYDROCHLORIDE 50 MG/ML
12.5 INJECTION INTRAMUSCULAR; INTRAVENOUS AS NEEDED
Status: DISCONTINUED | OUTPATIENT
Start: 2020-02-20 | End: 2020-02-20 | Stop reason: HOSPADM

## 2020-02-20 RX ORDER — LIDOCAINE HYDROCHLORIDE AND EPINEPHRINE 10; 10 MG/ML; UG/ML
INJECTION, SOLUTION INFILTRATION; PERINEURAL AS NEEDED
Status: DISCONTINUED | OUTPATIENT
Start: 2020-02-20 | End: 2020-02-20 | Stop reason: HOSPADM

## 2020-02-20 RX ORDER — LIDOCAINE HYDROCHLORIDE 10 MG/ML
INJECTION, SOLUTION EPIDURAL; INFILTRATION; INTRACAUDAL; PERINEURAL AS NEEDED
Status: DISCONTINUED | OUTPATIENT
Start: 2020-02-20 | End: 2020-02-20 | Stop reason: SURG

## 2020-02-20 RX ADMIN — MIDAZOLAM HYDROCHLORIDE 2 MG: 1 INJECTION INTRAMUSCULAR; INTRAVENOUS at 07:35:00

## 2020-02-20 RX ADMIN — PHENYLEPHRINE HCL 80 MCG: 10 MG/ML VIAL (ML) INJECTION at 07:46:00

## 2020-02-20 RX ADMIN — DEXAMETHASONE SODIUM PHOSPHATE 8 MG: 4 MG/ML VIAL (ML) INJECTION at 07:47:00

## 2020-02-20 RX ADMIN — LIDOCAINE HYDROCHLORIDE 50 MG: 10 INJECTION, SOLUTION EPIDURAL; INFILTRATION; INTRACAUDAL; PERINEURAL at 07:37:00

## 2020-02-20 RX ADMIN — PHENYLEPHRINE HCL 80 MCG: 10 MG/ML VIAL (ML) INJECTION at 08:24:00

## 2020-02-20 RX ADMIN — EPHEDRINE SULFATE 10 MG: 50 INJECTION, SOLUTION INTRAVENOUS at 08:04:00

## 2020-02-20 RX ADMIN — ONDANSETRON 4 MG: 2 INJECTION INTRAMUSCULAR; INTRAVENOUS at 09:19:00

## 2020-02-20 RX ADMIN — EPHEDRINE SULFATE 5 MG: 50 INJECTION, SOLUTION INTRAVENOUS at 07:54:00

## 2020-02-20 RX ADMIN — PHENYLEPHRINE HCL 80 MCG: 10 MG/ML VIAL (ML) INJECTION at 07:50:00

## 2020-02-20 RX ADMIN — PHENYLEPHRINE HCL 80 MCG: 10 MG/ML VIAL (ML) INJECTION at 07:52:00

## 2020-02-20 RX ADMIN — PHENYLEPHRINE HCL 80 MCG: 10 MG/ML VIAL (ML) INJECTION at 08:51:00

## 2020-02-20 RX ADMIN — SODIUM CHLORIDE, SODIUM LACTATE, POTASSIUM CHLORIDE, CALCIUM CHLORIDE: 600; 310; 30; 20 INJECTION, SOLUTION INTRAVENOUS at 09:45:00

## 2020-02-20 RX ADMIN — EPHEDRINE SULFATE 10 MG: 50 INJECTION, SOLUTION INTRAVENOUS at 07:58:00

## 2020-02-20 NOTE — INTERVAL H&P NOTE
Pre-op Diagnosis: MULTINODULAR GOITER    The above referenced H&P was reviewed by Divya Guerra MD on 2/20/2020, the patient was examined and no significant changes have occurred in the patient's condition since the H&P was performed.   I discussed wit

## 2020-02-20 NOTE — ANESTHESIA PREPROCEDURE EVALUATION
PRE-OP EVALUATION    Patient Name: Emi Fleming    Pre-op Diagnosis: MULTINODULAR GOITER    Procedure(s):  BILATERAL TOTAL THYROIDECTOMY    Surgeon(s) and Role:     * Brett Mendenhall MD - Primary     * Karen Olivares MD    Pre-op vitals reviewed. 02/18/2020    HCT 39.7 02/18/2020    .3 (H) 02/18/2020    MCH 32.7 02/18/2020    MCHC 32.0 02/18/2020    RDW 12.9 02/18/2020    .0 02/18/2020     Lab Results   Component Value Date     02/18/2020    K 3.7 02/18/2020     02/18/2020

## 2020-02-20 NOTE — ANESTHESIA PROCEDURE NOTES
Airway  Date/Time: 2/20/2020 7:39 AM  Urgency: elective    Airway not difficult    General Information and Staff    Patient location during procedure: OR  Anesthesiologist: Adan Viveros MD  Resident/CRNA: Naida Santo CRNA  Performed: CRNA     Ind

## 2020-02-20 NOTE — OPERATIVE REPORT
[facility]    PATIENT'S NAME: Vallie Spatz   ATTENDING PHYSICIAN: Jelani Lorenzo M.D. OPERATING PHYSICIAN: Jelani Lorenzo M.D.    PATIENT ACCOUNT#:   [de-identified]    LOCATION:  PACU [facility] PACU 7 EDWP 10  MEDICAL RECORD #:   [id number]       DATE OF thyroid. These were clamped and tied off with silk ties. Smaller vessels were sealed with the Harmonic scalpel. The superior parathyroid gland was teased free of the thyroid capsule, taking care to keep its blood supply intact.   We then rolled the thyro

## 2020-02-20 NOTE — BRIEF OP NOTE
Pre-Operative Diagnosis: MULTINODULAR GOITER     Post-Operative Diagnosis: MULTINODULAR GOITER      Procedure Performed:   Procedure(s):  BILATERAL TOTAL THYROIDECTOMY    Surgeon(s) and Role:     * Keerthi Elder MD - Primary     * Mine Armstrong MD

## 2020-02-20 NOTE — ANESTHESIA POSTPROCEDURE EVALUATION
Tommie Cook 89 Patient Status:  Outpatient in a Bed   Age/Gender 53year old female MRN [id number]   [facility] SURGERY Attending Marla Madsen MD   Hosp Day # 0 PCP Génesis Uribe MD       Anesthesia Post-op Note

## 2020-02-21 VITALS
BODY MASS INDEX: 31.28 KG/M2 | HEART RATE: 96 BPM | OXYGEN SATURATION: 96 % | DIASTOLIC BLOOD PRESSURE: 54 MMHG | HEIGHT: 62 IN | SYSTOLIC BLOOD PRESSURE: 114 MMHG | WEIGHT: 170 LBS | TEMPERATURE: 98 F | RESPIRATION RATE: 18 BRPM

## 2020-02-21 LAB — CALCIUM BLD-MCNC: 8.6 MG/DL (ref 8.5–10.1)

## 2020-02-21 PROCEDURE — 82310 ASSAY OF CALCIUM: CPT | Performed by: OTOLARYNGOLOGY

## 2020-02-21 RX ORDER — ONDANSETRON 4 MG/1
4 TABLET, ORALLY DISINTEGRATING ORAL EVERY 4 HOURS PRN
Qty: 10 TABLET | Refills: 1 | Status: SHIPPED | OUTPATIENT
Start: 2020-02-21 | End: 2020-02-28

## 2020-02-21 RX ORDER — CALCIUM CARBONATE 200(500)MG
1500 TABLET,CHEWABLE ORAL
Qty: 60 TABLET | Refills: 3 | Status: SHIPPED | OUTPATIENT
Start: 2020-02-21 | End: 2022-01-11

## 2020-02-21 RX ORDER — HYDROCODONE BITARTRATE AND ACETAMINOPHEN 5; 325 MG/1; MG/1
2 TABLET ORAL EVERY 4 HOURS PRN
Qty: 20 TABLET | Refills: 0 | Status: SHIPPED | OUTPATIENT
Start: 2020-02-21 | End: 2020-03-14

## 2020-02-21 RX ORDER — LEVOTHYROXINE SODIUM 0.1 MG/1
100 TABLET ORAL
Qty: 90 TABLET | Refills: 3 | Status: SHIPPED | OUTPATIENT
Start: 2020-02-22 | End: 2021-03-28

## 2020-02-21 NOTE — PLAN OF CARE
Received pt from PACU. Nausea & throat pain. Medications given with slight relief. States she has HA which has not been relieved with any meds. Did not like the ice pack on her neck, states it makes her too cold.

## 2020-02-21 NOTE — PLAN OF CARE
Assumed care of patient at [address]. HOB elevated. Drsg neck C/D/I. Pt c/o neck/incision pain/migraine PRN pain meds provided. C/o nausea PRN meds provided. IVF infusing. Fall precautions in place. Will continue to monitor.  Labs in am.

## 2020-02-21 NOTE — PROGRESS NOTES
[facility]  Progress Note    Abdulaziz Pulido Patient Status:  Outpatient in a Bed    1975 MRN [id number]   [facility] 0SW-A Attending Dakota Nice MD   Hosp Day # 0 PCP Ramya Quevedo MD     Subjective:  Some nausea.   No

## 2020-02-26 ENCOUNTER — OFFICE VISIT (OUTPATIENT)
Dept: FAMILY MEDICINE CLINIC | Facility: CLINIC | Age: 45
End: 2020-02-26

## 2020-02-26 ENCOUNTER — TELEPHONE (OUTPATIENT)
Dept: FAMILY MEDICINE CLINIC | Facility: CLINIC | Age: 45
End: 2020-02-26

## 2020-02-26 VITALS
OXYGEN SATURATION: 99 % | SYSTOLIC BLOOD PRESSURE: 100 MMHG | TEMPERATURE: 98 F | HEART RATE: 67 BPM | WEIGHT: 169.38 LBS | HEIGHT: 62 IN | BODY MASS INDEX: 31.17 KG/M2 | DIASTOLIC BLOOD PRESSURE: 60 MMHG | RESPIRATION RATE: 16 BRPM

## 2020-02-26 DIAGNOSIS — G43.009 MIGRAINE WITHOUT AURA AND WITHOUT STATUS MIGRAINOSUS, NOT INTRACTABLE: ICD-10-CM

## 2020-02-26 DIAGNOSIS — J30.1 SEASONAL ALLERGIC RHINITIS DUE TO POLLEN: ICD-10-CM

## 2020-02-26 DIAGNOSIS — R73.03 PREDIABETES: ICD-10-CM

## 2020-02-26 DIAGNOSIS — E04.2 NONTOXIC MULTINODULAR GOITER: Primary | ICD-10-CM

## 2020-02-26 DIAGNOSIS — D75.89 MACROCYTOSIS: ICD-10-CM

## 2020-02-26 DIAGNOSIS — D72.819 LEUKOPENIA, UNSPECIFIED TYPE: ICD-10-CM

## 2020-02-26 DIAGNOSIS — E89.0 S/P COMPLETE THYROIDECTOMY: ICD-10-CM

## 2020-02-26 PROCEDURE — 99214 OFFICE O/P EST MOD 30 MIN: CPT | Performed by: FAMILY MEDICINE

## 2020-02-26 PROCEDURE — 1111F DSCHRG MED/CURRENT MED MERGE: CPT | Performed by: FAMILY MEDICINE

## 2020-02-26 NOTE — TELEPHONE ENCOUNTER
Reason for the order/referral: f/u visit after surgery  PCP: Marvel Bautista    Refer to Provider (first and last name): Dr Ruby Haney  Specialty: ENT   Patient Insurance: [facility]   Duration/Summary of Symptoms:   Has the patient been seen by their PCP for t

## 2020-02-27 NOTE — PROGRESS NOTES
Bonnie Good is a 53year old female. HPI:  Pt is here for f/u after thyroidectomy on 2/20/2020  Was discharged home on 2/21/2020. Some tenderness at incision site, but improving.   To see ENT tomorrow  No fevers or chills  Noted jitteriness and some HISTORY      lap for ectopic after Delivery 1991 with D&C   • OTHER SURGICAL HISTORY  10/2015    IR Cerebral Angiogram: No intracranial Aneurysm   • THYROIDECTOMY  02/20/2020   • THYROIDECTOMY Bilateral 2/20/2020    Performed by Jesse Rushing MD at E controlled. Will need to monitor TFTs. Continue calcium supplement.  - ENT - INTERNAL    3. Seasonal allergic rhinitis due to pollen  Continue Allegra daily. Benadryl 25 mg nightly/PRN. Noted had some itching on arms and back. No rashes noted.   Moistu

## 2020-03-14 PROBLEM — R73.03 PREDIABETES: Status: ACTIVE | Noted: 2020-03-14

## 2020-04-15 ENCOUNTER — TELEPHONE (OUTPATIENT)
Dept: FAMILY MEDICINE CLINIC | Facility: CLINIC | Age: 45
End: 2020-04-15

## 2020-04-15 NOTE — TELEPHONE ENCOUNTER
Pt called asking to schedule an in person appointment. Informed her not seeing Pt's due to current health issues. Informed her of the Video miah. Pt does not want one, prefers phone. Reason for miah:   Wants to f/u & discuss symptoms after her Thyroid

## 2020-04-16 ENCOUNTER — VIRTUAL PHONE E/M (OUTPATIENT)
Dept: FAMILY MEDICINE CLINIC | Facility: CLINIC | Age: 45
End: 2020-04-16

## 2020-04-16 DIAGNOSIS — R25.2 LEG CRAMPS: ICD-10-CM

## 2020-04-16 DIAGNOSIS — N63.10 BREAST MASS, RIGHT: ICD-10-CM

## 2020-04-16 DIAGNOSIS — D72.819 LEUKOPENIA, UNSPECIFIED TYPE: ICD-10-CM

## 2020-04-16 DIAGNOSIS — E89.0 POSTSURGICAL HYPOTHYROIDISM: Primary | ICD-10-CM

## 2020-04-16 DIAGNOSIS — D75.89 MACROCYTOSIS: ICD-10-CM

## 2020-04-16 DIAGNOSIS — N63.20 BREAST MASS, LEFT: ICD-10-CM

## 2020-04-16 PROCEDURE — 99214 OFFICE O/P EST MOD 30 MIN: CPT | Performed by: FAMILY MEDICINE

## 2020-04-16 RX ORDER — ACETAMINOPHEN 160 MG
TABLET,DISINTEGRATING ORAL 2 TIMES DAILY
COMMUNITY
End: 2021-01-12

## 2020-04-16 NOTE — PROGRESS NOTES
Virtual Telephone Check-In    Prasanna Anderson verbally consents to a Virtual/Telephone Check-In visit on 04/16/20. Patient understands and accepts financial responsibility for any deductible, co-insurance and/or co-pays associated with this service. -     COMP METABOLIC PANEL (14); Future    Leg cramps  Feels better when increases Ca supplement intake  Cont TID and prn supplement for now. Ck level with labs  -     COMP METABOLIC PANEL (14);  Future    Breast mass, left  -     [facility] MARCELINO 2D+3D DIAGNOST

## 2020-04-30 ENCOUNTER — LAB ENCOUNTER (OUTPATIENT)
Dept: LAB | Age: 45
End: 2020-04-30
Attending: FAMILY MEDICINE
Payer: COMMERCIAL

## 2020-04-30 ENCOUNTER — HOSPITAL ENCOUNTER (OUTPATIENT)
Dept: MAMMOGRAPHY | Age: 45
Discharge: HOME | End: 2020-04-30
Attending: FAMILY MEDICINE
Payer: COMMERCIAL

## 2020-04-30 ENCOUNTER — HOSPITAL ENCOUNTER (OUTPATIENT)
Dept: ULTRASOUND IMAGING | Age: 45
Discharge: HOME | End: 2020-04-30
Attending: FAMILY MEDICINE
Payer: COMMERCIAL

## 2020-04-30 DIAGNOSIS — D72.819 LEUKOPENIA, UNSPECIFIED TYPE: ICD-10-CM

## 2020-04-30 DIAGNOSIS — N63.10 BREAST MASS, RIGHT: ICD-10-CM

## 2020-04-30 DIAGNOSIS — R25.2 LEG CRAMPS: ICD-10-CM

## 2020-04-30 DIAGNOSIS — N63.20 BREAST MASS, LEFT: ICD-10-CM

## 2020-04-30 DIAGNOSIS — D75.89 MACROCYTOSIS: ICD-10-CM

## 2020-04-30 DIAGNOSIS — E04.1 NONTOXIC UNINODULAR GOITER: ICD-10-CM

## 2020-04-30 DIAGNOSIS — E89.0 POSTSURGICAL HYPOTHYROIDISM: ICD-10-CM

## 2020-04-30 PROCEDURE — 80053 COMPREHEN METABOLIC PANEL: CPT

## 2020-04-30 PROCEDURE — 84439 ASSAY OF FREE THYROXINE: CPT

## 2020-04-30 PROCEDURE — 77062 BREAST TOMOSYNTHESIS BI: CPT | Performed by: FAMILY MEDICINE

## 2020-04-30 PROCEDURE — 36415 COLL VENOUS BLD VENIPUNCTURE: CPT

## 2020-04-30 PROCEDURE — 82746 ASSAY OF FOLIC ACID SERUM: CPT

## 2020-04-30 PROCEDURE — 76642 ULTRASOUND BREAST LIMITED: CPT | Performed by: FAMILY MEDICINE

## 2020-04-30 PROCEDURE — 85025 COMPLETE CBC W/AUTO DIFF WBC: CPT

## 2020-04-30 PROCEDURE — 84443 ASSAY THYROID STIM HORMONE: CPT

## 2020-04-30 PROCEDURE — 77066 DX MAMMO INCL CAD BI: CPT | Performed by: FAMILY MEDICINE

## 2020-05-04 ENCOUNTER — TELEPHONE (OUTPATIENT)
Dept: FAMILY MEDICINE CLINIC | Facility: CLINIC | Age: 45
End: 2020-05-04

## 2020-05-04 DIAGNOSIS — N60.02 BILATERAL BREAST CYSTS: ICD-10-CM

## 2020-05-04 DIAGNOSIS — E89.0 HYPOTHYROIDISM, POSTSURGICAL: Primary | ICD-10-CM

## 2020-05-04 DIAGNOSIS — R73.09 ELEVATED HEMOGLOBIN A1C: ICD-10-CM

## 2020-05-04 DIAGNOSIS — Z51.81 ENCOUNTER FOR MEDICATION MONITORING: ICD-10-CM

## 2020-05-04 DIAGNOSIS — N60.01 BILATERAL BREAST CYSTS: ICD-10-CM

## 2020-05-05 NOTE — TELEPHONE ENCOUNTER
----- Message from Bello Lora MD sent at 5/5/2020 12:49 PM CDT -----  Please notify patient blood sugar level was just slightly elevated at 100 if this was a fasting sample, otherwise if patient had eaten, it is normal.  Calcium level was 8.5, i.e. l

## 2020-05-05 NOTE — TELEPHONE ENCOUNTER
Please notify patient of lab and mammogram results as per result note on tests. Would like her to schedule appt for office breast exam sometime this month or early next month.

## 2020-05-06 NOTE — TELEPHONE ENCOUNTER
Patient called again asking for someone to call her with her results. She would like a call back today.

## 2020-05-07 PROCEDURE — 99213 OFFICE O/P EST LOW 20 MIN: CPT | Performed by: FAMILY MEDICINE

## 2020-05-07 NOTE — TELEPHONE ENCOUNTER
Virtual Telephone Check-In    Manish Arboleda verbally consents to a Virtual/Telephone Check-In visit on 05/07/20. Patient understands and accepts financial responsibility for any deductible, co-insurance and/or co-pays associated with this service. notes any changes. Discussed option to see Breast specialist and poss drainage of sxic cysts. Does have some tenderness at times with breast cysts, but prefers to avoid any drainage procedures. will monitor.     Yoanna Chan MD

## 2020-05-07 NOTE — TELEPHONE ENCOUNTER
Spoke to patient and gave information. She says her ENT told her to wean off of her Ca. She takes tums TID. Please advise.  Thank You

## 2020-05-18 ENCOUNTER — MED REC SCAN ONLY (OUTPATIENT)
Dept: FAMILY MEDICINE CLINIC | Facility: CLINIC | Age: 45
End: 2020-05-18

## 2020-12-21 ENCOUNTER — TELEPHONE (OUTPATIENT)
Dept: FAMILY MEDICINE CLINIC | Facility: CLINIC | Age: 45
End: 2020-12-21

## 2020-12-21 NOTE — TELEPHONE ENCOUNTER
Pt is calling with day 3 of a migraine. Pt says she knows migraines can mimic a stroke. first day -Feeling a pull in left eye and mouth. Doesn't know if she should go to ER? Says  is aware of this going on.  Please advise

## 2020-12-21 NOTE — TELEPHONE ENCOUNTER
Spoke to patient who states Migraine started with a very brief feeling of a pulling sensation on her left eye and a feeling of droopiness on the left side of her face. Did not look in mirror so unsure if she actually had a facial droop.   For 3 days has ha

## 2020-12-21 NOTE — TELEPHONE ENCOUNTER
Called patient and advised of Dr. Robert Iglesias response and recommendation to go to ED for evaluation and treatment. She still is hesitant and doesn't want to go. States she has concerns regarding Covid.   Her mother recently went to the hospital and didn't c

## 2020-12-21 NOTE — TELEPHONE ENCOUNTER
Would agree with pt being directed to ED for further evaluation given hx of TIAs. It may be Bell's palsy, even though she has hx of migraines. Would need proper tx.

## 2020-12-22 NOTE — TELEPHONE ENCOUNTER
Agree that pt should be evaluated in ED based on sxs and h/o complicated migraines/TIA. Pt should be evaluated ASAP. I have an overbooked schedule tomorrow and will not be able to see her in the office.  Please direct her to a local urgent care if she if sh

## 2020-12-22 NOTE — TELEPHONE ENCOUNTER
Called patient and advised of recommendations per Dr. Mayra Hinojosa note. States her migraine is slightly better today. Reiterated if symptoms persist or worsen, to go to IC/ED. States she will do that.

## 2021-01-08 ENCOUNTER — LAB ENCOUNTER (OUTPATIENT)
Dept: LAB | Age: 46
End: 2021-01-08
Attending: FAMILY MEDICINE
Payer: COMMERCIAL

## 2021-01-08 DIAGNOSIS — Z51.81 ENCOUNTER FOR MEDICATION MONITORING: ICD-10-CM

## 2021-01-08 DIAGNOSIS — E89.0 HYPOTHYROIDISM, POSTSURGICAL: ICD-10-CM

## 2021-01-08 DIAGNOSIS — R73.09 ELEVATED HEMOGLOBIN A1C: ICD-10-CM

## 2021-01-08 LAB
ALBUMIN SERPL-MCNC: 3.9 G/DL (ref 3.4–5)
ALBUMIN/GLOB SERPL: 1.1 {RATIO} (ref 1–2)
ALP LIVER SERPL-CCNC: 53 U/L
ALT SERPL-CCNC: 20 U/L
ANION GAP SERPL CALC-SCNC: 4 MMOL/L (ref 0–18)
AST SERPL-CCNC: 11 U/L (ref 15–37)
BILIRUB SERPL-MCNC: 0.9 MG/DL (ref 0.1–2)
BUN BLD-MCNC: 13 MG/DL (ref 7–18)
BUN/CREAT SERPL: 13.4 (ref 10–20)
CALCIUM BLD-MCNC: 8.4 MG/DL (ref 8.5–10.1)
CHLORIDE SERPL-SCNC: 108 MMOL/L (ref 98–112)
CO2 SERPL-SCNC: 27 MMOL/L (ref 21–32)
CREAT BLD-MCNC: 0.97 MG/DL
EST. AVERAGE GLUCOSE BLD GHB EST-MCNC: 134 MG/DL (ref 68–126)
GLOBULIN PLAS-MCNC: 3.6 G/DL (ref 2.8–4.4)
GLUCOSE BLD-MCNC: 89 MG/DL (ref 70–99)
HBA1C MFR BLD HPLC: 6.3 % (ref ?–5.7)
M PROTEIN MFR SERPL ELPH: 7.5 G/DL (ref 6.4–8.2)
OSMOLALITY SERPL CALC.SUM OF ELEC: 288 MOSM/KG (ref 275–295)
PATIENT FASTING Y/N/NP: NO
POTASSIUM SERPL-SCNC: 3.6 MMOL/L (ref 3.5–5.1)
SODIUM SERPL-SCNC: 139 MMOL/L (ref 136–145)
T4 FREE SERPL-MCNC: 1.4 NG/DL (ref 0.8–1.7)
TSI SER-ACNC: 0.16 MIU/ML (ref 0.36–3.74)

## 2021-01-08 PROCEDURE — 84439 ASSAY OF FREE THYROXINE: CPT

## 2021-01-08 PROCEDURE — 80053 COMPREHEN METABOLIC PANEL: CPT

## 2021-01-08 PROCEDURE — 83036 HEMOGLOBIN GLYCOSYLATED A1C: CPT

## 2021-01-08 PROCEDURE — 84443 ASSAY THYROID STIM HORMONE: CPT

## 2021-01-08 PROCEDURE — 36415 COLL VENOUS BLD VENIPUNCTURE: CPT

## 2021-01-12 ENCOUNTER — OFFICE VISIT (OUTPATIENT)
Dept: FAMILY MEDICINE CLINIC | Facility: CLINIC | Age: 46
End: 2021-01-12

## 2021-01-12 VITALS
HEIGHT: 62 IN | DIASTOLIC BLOOD PRESSURE: 62 MMHG | TEMPERATURE: 97 F | SYSTOLIC BLOOD PRESSURE: 114 MMHG | HEART RATE: 83 BPM | OXYGEN SATURATION: 95 % | RESPIRATION RATE: 18 BRPM | BODY MASS INDEX: 31.83 KG/M2 | WEIGHT: 173 LBS

## 2021-01-12 DIAGNOSIS — E53.8 VITAMIN B12 DEFICIENCY: ICD-10-CM

## 2021-01-12 DIAGNOSIS — L70.0 ACNE VULGARIS: ICD-10-CM

## 2021-01-12 DIAGNOSIS — E55.9 VITAMIN D DEFICIENCY: ICD-10-CM

## 2021-01-12 DIAGNOSIS — Z82.49 FAMILY HISTORY OF ABDOMINAL AORTIC ANEURYSM (AAA): ICD-10-CM

## 2021-01-12 DIAGNOSIS — Z00.00 LABORATORY EXAMINATION ORDERED AS PART OF A COMPLETE PHYSICAL EXAMINATION: ICD-10-CM

## 2021-01-12 DIAGNOSIS — F43.21 GRIEF REACTION: ICD-10-CM

## 2021-01-12 DIAGNOSIS — E83.51 HYPOCALCEMIA: ICD-10-CM

## 2021-01-12 DIAGNOSIS — G43.009 MIGRAINE WITHOUT AURA AND WITHOUT STATUS MIGRAINOSUS, NOT INTRACTABLE: ICD-10-CM

## 2021-01-12 DIAGNOSIS — R73.03 PREDIABETES: ICD-10-CM

## 2021-01-12 DIAGNOSIS — D72.819 LEUKOPENIA, UNSPECIFIED TYPE: ICD-10-CM

## 2021-01-12 DIAGNOSIS — E89.0 POSTSURGICAL HYPOTHYROIDISM: ICD-10-CM

## 2021-01-12 DIAGNOSIS — G43.109 COMPLICATED MIGRAINE: Primary | ICD-10-CM

## 2021-01-12 PROCEDURE — 3078F DIAST BP <80 MM HG: CPT | Performed by: FAMILY MEDICINE

## 2021-01-12 PROCEDURE — 99215 OFFICE O/P EST HI 40 MIN: CPT | Performed by: FAMILY MEDICINE

## 2021-01-12 PROCEDURE — 3074F SYST BP LT 130 MM HG: CPT | Performed by: FAMILY MEDICINE

## 2021-01-12 PROCEDURE — 3008F BODY MASS INDEX DOCD: CPT | Performed by: FAMILY MEDICINE

## 2021-01-12 NOTE — PROGRESS NOTES
[place] Monday is a 50year old female. HPI:  Patient is here to discuss multiple issues. Pt c/o flare of migraine about 3 weeks ago, started a few days before onset of menses, which is not unusual for patient.   Noted migraine headache associated with Refill   • Multiple Vitamin (MULTIVITAMIN OR) Take by mouth daily.      • Levothyroxine Sodium (SYNTHROID) 100 MCG Oral Tab Take 1 tablet (100 mcg total) by mouth every morning before breakfast. 90 tablet 3   • Calcium Carbonate Antacid 500 MG Oral Chew Tab shortness of breath with exertion, no cough  CARDIOVASCULAR: denies chest pain on exertion  GI: denies abdominal pain and denies heartburn  : normal bladder  NEURO: As above  EXAM:  /62   Pulse 83   Temp 96.6 °F (35.9 °C) (Temporal)   Resp 18   Ht paresthesias. She was diagnosed with complex migraines versus TIA. She had MRI imaging showing an incidentally noted 2 mm JOSE aneurysm versus infundibulum, a normal anatomic variant. Stroke w/u was negative.   She subsequently saw interventional neurologi Metformin, which patient defers. Monitor with therapeutic lifestyle changes.   - BASIC METABOLIC PANEL (8); Future    6. Leukopenia, unspecified type  Has seen hematologist with full work-up in past, clinically benign.   Monitor.  - CBC WITH DIFFERENTIAL W

## 2021-02-03 ENCOUNTER — HOSPITAL ENCOUNTER (OUTPATIENT)
Dept: ULTRASOUND IMAGING | Age: 46
Discharge: HOME | End: 2021-02-03
Attending: FAMILY MEDICINE
Payer: COMMERCIAL

## 2021-02-03 DIAGNOSIS — Z82.49 FAMILY HISTORY OF ABDOMINAL AORTIC ANEURYSM (AAA): ICD-10-CM

## 2021-02-03 PROCEDURE — 76770 US EXAM ABDO BACK WALL COMP: CPT | Performed by: FAMILY MEDICINE

## 2021-03-08 ENCOUNTER — OFFICE VISIT (OUTPATIENT)
Dept: NEUROLOGY | Facility: CLINIC | Age: 46
End: 2021-03-08

## 2021-03-08 VITALS
DIASTOLIC BLOOD PRESSURE: 68 MMHG | BODY MASS INDEX: 32 KG/M2 | RESPIRATION RATE: 16 BRPM | SYSTOLIC BLOOD PRESSURE: 130 MMHG | HEART RATE: 70 BPM | WEIGHT: 173 LBS

## 2021-03-08 DIAGNOSIS — R41.3 SHORT-TERM MEMORY LOSS: ICD-10-CM

## 2021-03-08 DIAGNOSIS — G43.109 MIGRAINE WITH AURA AND WITHOUT STATUS MIGRAINOSUS, NOT INTRACTABLE: Primary | ICD-10-CM

## 2021-03-08 DIAGNOSIS — E03.9 HYPOTHYROIDISM, UNSPECIFIED TYPE: ICD-10-CM

## 2021-03-08 PROCEDURE — 3078F DIAST BP <80 MM HG: CPT | Performed by: OTHER

## 2021-03-08 PROCEDURE — 99244 OFF/OP CNSLTJ NEW/EST MOD 40: CPT | Performed by: OTHER

## 2021-03-08 PROCEDURE — 3075F SYST BP GE 130 - 139MM HG: CPT | Performed by: OTHER

## 2021-03-08 RX ORDER — BUTALBITAL, ACETAMINOPHEN AND CAFFEINE 50; 325; 40 MG/1; MG/1; MG/1
TABLET ORAL
Qty: 15 TABLET | Refills: 0 | Status: SHIPPED | OUTPATIENT
Start: 2021-03-08 | End: 2022-01-11

## 2021-03-08 NOTE — PROGRESS NOTES
[address]   Neurology    Gaparth Galarzamarcy Patient Status:  No patient class for patient encounter    1975 MRN [id number]   Location [facility] PCP Génesis Uribe MD              HPI [phone]   XA HNF/UFH        1   Icterus        1   Lipemia        1       Past Medical History:  Past Medical History:   Diagnosis Date   • Acute maxillary sinusitis    • Allergic rhinitis    • Anemia    • Dysphagia, unspecified(735.20)    • History of abn throat    MEDICATIONS:    Current Outpatient Medications:   •  Butalbital-APAP-Caffeine -40 MG Oral Tab, Take at onset of migraine, can repeat in 4-6 hours.  Do not take more than 2-3 times a week, Disp: 15 tablet, Rfl: 0  •  Tazarotene (TAZORAC) 0.1 Motor exam revealed normal muscle bulk and tone. No atrophy or fasciculations. Manual muscle testing revealed MRC grade 5/5 strength throughout including proximal and distal muscles of the arms and legs.   Deep tendon reflexes were 2 at the biceps, brachi [place] [employer], DO  Neuromuscular and General Neurology  [facility]

## 2021-03-08 NOTE — PROGRESS NOTES
Patient states 3-4 migraines a a month and increase during menstrual. Patient also states headaches on and off. Patient states left sided numbness and tingling with migraines. Denies speech or balance issues. Patient is having memory fog.

## 2021-03-18 ENCOUNTER — LAB ENCOUNTER (OUTPATIENT)
Dept: LAB | Age: 46
End: 2021-03-18
Attending: FAMILY MEDICINE
Payer: COMMERCIAL

## 2021-03-18 DIAGNOSIS — D72.819 LEUKOPENIA, UNSPECIFIED TYPE: ICD-10-CM

## 2021-03-18 DIAGNOSIS — E55.9 VITAMIN D DEFICIENCY: ICD-10-CM

## 2021-03-18 DIAGNOSIS — R73.03 PREDIABETES: ICD-10-CM

## 2021-03-18 DIAGNOSIS — E53.8 VITAMIN B12 DEFICIENCY: ICD-10-CM

## 2021-03-18 DIAGNOSIS — Z00.00 LABORATORY EXAMINATION ORDERED AS PART OF A COMPLETE PHYSICAL EXAMINATION: ICD-10-CM

## 2021-03-18 DIAGNOSIS — E89.0 POSTSURGICAL HYPOTHYROIDISM: ICD-10-CM

## 2021-03-18 LAB
ANION GAP SERPL CALC-SCNC: 5 MMOL/L (ref 0–18)
BASOPHILS # BLD AUTO: 0.02 X10(3) UL (ref 0–0.2)
BASOPHILS NFR BLD AUTO: 0.5 %
BUN BLD-MCNC: 12 MG/DL (ref 7–18)
BUN/CREAT SERPL: 13.6 (ref 10–20)
CALCIUM BLD-MCNC: 8.9 MG/DL (ref 8.5–10.1)
CHLORIDE SERPL-SCNC: 106 MMOL/L (ref 98–112)
CHOLEST SMN-MCNC: 168 MG/DL (ref ?–200)
CO2 SERPL-SCNC: 30 MMOL/L (ref 21–32)
CREAT BLD-MCNC: 0.88 MG/DL
DEPRECATED RDW RBC AUTO: 47.6 FL (ref 35.1–46.3)
EOSINOPHIL # BLD AUTO: 0.06 X10(3) UL (ref 0–0.7)
EOSINOPHIL NFR BLD AUTO: 1.5 %
ERYTHROCYTE [DISTWIDTH] IN BLOOD BY AUTOMATED COUNT: 13 % (ref 11–15)
GLUCOSE BLD-MCNC: 89 MG/DL (ref 70–99)
HCT VFR BLD AUTO: 38.9 %
HDLC SERPL-MCNC: 37 MG/DL (ref 40–59)
HGB BLD-MCNC: 12.8 G/DL
IMM GRANULOCYTES # BLD AUTO: 0 X10(3) UL (ref 0–1)
IMM GRANULOCYTES NFR BLD: 0 %
LDLC SERPL CALC-MCNC: 116 MG/DL (ref ?–100)
LYMPHOCYTES # BLD AUTO: 1.52 X10(3) UL (ref 1–4)
LYMPHOCYTES NFR BLD AUTO: 38.9 %
MCH RBC QN AUTO: 33 PG (ref 26–34)
MCHC RBC AUTO-ENTMCNC: 32.9 G/DL (ref 31–37)
MCV RBC AUTO: 100.3 FL
MONOCYTES # BLD AUTO: 0.36 X10(3) UL (ref 0.1–1)
MONOCYTES NFR BLD AUTO: 9.2 %
NEUTROPHILS # BLD AUTO: 1.95 X10 (3) UL (ref 1.5–7.7)
NEUTROPHILS # BLD AUTO: 1.95 X10(3) UL (ref 1.5–7.7)
NEUTROPHILS NFR BLD AUTO: 49.9 %
NONHDLC SERPL-MCNC: 131 MG/DL (ref ?–130)
OSMOLALITY SERPL CALC.SUM OF ELEC: 291 MOSM/KG (ref 275–295)
PATIENT FASTING Y/N/NP: YES
PATIENT FASTING Y/N/NP: YES
PLATELET # BLD AUTO: 241 10(3)UL (ref 150–450)
POTASSIUM SERPL-SCNC: 3.5 MMOL/L (ref 3.5–5.1)
RBC # BLD AUTO: 3.88 X10(6)UL
SODIUM SERPL-SCNC: 141 MMOL/L (ref 136–145)
T4 FREE SERPL-MCNC: 1 NG/DL (ref 0.8–1.7)
TRIGL SERPL-MCNC: 76 MG/DL (ref 30–149)
TSI SER-ACNC: 0.35 MIU/ML (ref 0.36–3.74)
VIT B12 SERPL-MCNC: 1277 PG/ML (ref 193–986)
VIT D+METAB SERPL-MCNC: 42 NG/ML (ref 30–100)
VLDLC SERPL CALC-MCNC: 15 MG/DL (ref 0–30)
WBC # BLD AUTO: 3.9 X10(3) UL (ref 4–11)

## 2021-03-18 PROCEDURE — 84439 ASSAY OF FREE THYROXINE: CPT

## 2021-03-18 PROCEDURE — 82306 VITAMIN D 25 HYDROXY: CPT

## 2021-03-18 PROCEDURE — 85025 COMPLETE CBC W/AUTO DIFF WBC: CPT

## 2021-03-18 PROCEDURE — 36415 COLL VENOUS BLD VENIPUNCTURE: CPT

## 2021-03-18 PROCEDURE — 84443 ASSAY THYROID STIM HORMONE: CPT

## 2021-03-18 PROCEDURE — 80048 BASIC METABOLIC PNL TOTAL CA: CPT

## 2021-03-18 PROCEDURE — 82607 VITAMIN B-12: CPT

## 2021-03-18 PROCEDURE — 80061 LIPID PANEL: CPT

## 2021-03-23 ENCOUNTER — OFFICE VISIT (OUTPATIENT)
Dept: FAMILY MEDICINE CLINIC | Facility: CLINIC | Age: 46
End: 2021-03-23
Payer: COMMERCIAL

## 2021-03-23 VITALS
RESPIRATION RATE: 16 BRPM | WEIGHT: 174 LBS | DIASTOLIC BLOOD PRESSURE: 64 MMHG | HEART RATE: 96 BPM | HEIGHT: 62 IN | OXYGEN SATURATION: 97 % | SYSTOLIC BLOOD PRESSURE: 116 MMHG | BODY MASS INDEX: 32.02 KG/M2 | TEMPERATURE: 98 F

## 2021-03-23 DIAGNOSIS — E89.0 POSTSURGICAL HYPOTHYROIDISM: ICD-10-CM

## 2021-03-23 DIAGNOSIS — D72.819 LEUKOPENIA, UNSPECIFIED TYPE: ICD-10-CM

## 2021-03-23 DIAGNOSIS — R73.03 PREDIABETES: ICD-10-CM

## 2021-03-23 DIAGNOSIS — E78.5 DYSLIPIDEMIA: ICD-10-CM

## 2021-03-23 DIAGNOSIS — Z12.31 VISIT FOR SCREENING MAMMOGRAM: ICD-10-CM

## 2021-03-23 DIAGNOSIS — N85.2 BULKY OR ENLARGED UTERUS: ICD-10-CM

## 2021-03-23 DIAGNOSIS — G43.009 MIGRAINE WITHOUT AURA AND WITHOUT STATUS MIGRAINOSUS, NOT INTRACTABLE: ICD-10-CM

## 2021-03-23 DIAGNOSIS — R25.2 LEG CRAMPS: ICD-10-CM

## 2021-03-23 DIAGNOSIS — Z01.419 WELL WOMAN EXAM WITH ROUTINE GYNECOLOGICAL EXAM: Primary | ICD-10-CM

## 2021-03-23 DIAGNOSIS — D75.89 MACROCYTOSIS WITHOUT ANEMIA: ICD-10-CM

## 2021-03-23 DIAGNOSIS — L70.0 ACNE VULGARIS: ICD-10-CM

## 2021-03-23 DIAGNOSIS — Z51.81 ENCOUNTER FOR MEDICATION MONITORING: ICD-10-CM

## 2021-03-23 PROCEDURE — 3074F SYST BP LT 130 MM HG: CPT | Performed by: FAMILY MEDICINE

## 2021-03-23 PROCEDURE — 99396 PREV VISIT EST AGE 40-64: CPT | Performed by: FAMILY MEDICINE

## 2021-03-23 PROCEDURE — 3078F DIAST BP <80 MM HG: CPT | Performed by: FAMILY MEDICINE

## 2021-03-23 PROCEDURE — 3008F BODY MASS INDEX DOCD: CPT | Performed by: FAMILY MEDICINE

## 2021-03-23 RX ORDER — MULTIVIT WITH MINERALS/LUTEIN
500 TABLET ORAL DAILY
COMMUNITY
End: 2022-01-11

## 2021-03-23 RX ORDER — LEVOTHYROXINE SODIUM 88 UG/1
88 TABLET ORAL
Qty: 90 TABLET | Refills: 0 | Status: SHIPPED | OUTPATIENT
Start: 2021-03-23 | End: 2021-06-18

## 2021-03-23 RX ORDER — UBIDECARENONE 75 MG
250 CAPSULE ORAL DAILY
COMMUNITY
End: 2022-01-11

## 2021-03-23 NOTE — PROGRESS NOTES
Jami Kerr is a 50year old female. HPI:  Pt is here for routine physical/WWE  No regular exercise now . Was walking regularly in Jan and Feb but then stopped. Planning to restart  Diet could be healthier.   Saw Neuro and Rx given for Fioricet to use differently: Chew 1 tablet by mouth 2 (two) times daily.  ) 60 tablet 3   • Fexofenadine HCl (ALLEGRA ODT OR) Take 180 mg by mouth as needed.              Past Medical History:   Diagnosis Date   • Acute maxillary sinusitis    • Allergic rhinitis    • Anemi (78.9 kg)   [facility] 03/07/2021   SpO2 97%   BMI 31.83 kg/m²   Wt Readings from Last 6 Encounters:  03/23/21 : 174 lb (78.9 kg)  03/08/21 : 173 lb (78.5 kg)  01/12/21 : 173 lb (78.5 kg)  02/26/20 : 169 lb 6.4 oz (76.8 kg)  02/20/20 : 169 lb 15.6 oz (77.1 kg)  0 LACI (CPT=77067/95043); Future    4. Postsurgical hypothyroidism  Labs with TSH low at 0.346. Free T4 normal.  Some improvement in TSH versus previous, but still below normal.  Will decrease levothyroxine dose to 88 mcg daily.   Reviewed any vitamins/supp

## 2021-04-02 ENCOUNTER — APPOINTMENT (OUTPATIENT)
Dept: ULTRASOUND IMAGING | Facility: HOSPITAL | Age: 46
End: 2021-04-02
Attending: PHYSICIAN ASSISTANT
Payer: COMMERCIAL

## 2021-04-02 ENCOUNTER — TELEPHONE (OUTPATIENT)
Dept: FAMILY MEDICINE CLINIC | Facility: CLINIC | Age: 46
End: 2021-04-02

## 2021-04-02 ENCOUNTER — HOSPITAL ENCOUNTER (OUTPATIENT)
Age: 46
Discharge: HOME | End: 2021-04-02
Payer: COMMERCIAL

## 2021-04-02 VITALS
BODY MASS INDEX: 32.02 KG/M2 | DIASTOLIC BLOOD PRESSURE: 70 MMHG | HEIGHT: 62 IN | TEMPERATURE: 98 F | OXYGEN SATURATION: 98 % | WEIGHT: 174 LBS | HEART RATE: 76 BPM | RESPIRATION RATE: 16 BRPM | SYSTOLIC BLOOD PRESSURE: 96 MMHG

## 2021-04-02 DIAGNOSIS — M79.604 RIGHT LEG PAIN: Primary | ICD-10-CM

## 2021-04-02 PROCEDURE — 99203 OFFICE O/P NEW LOW 30 MIN: CPT | Performed by: PHYSICIAN ASSISTANT

## 2021-04-02 PROCEDURE — 93971 EXTREMITY STUDY: CPT | Performed by: PHYSICIAN ASSISTANT

## 2021-04-02 NOTE — ED INITIAL ASSESSMENT (HPI)
The patient is here for evaluation of right lower leg pain x 1 week.  She states it is tender to touch and painful to walk, extend/flex, etc. She states they have been traveling and she drove about 6 hours this past weekend and the weekend before she drove

## 2021-04-02 NOTE — TELEPHONE ENCOUNTER
Patient called complaining of right lower leg pain worse right above her ankle for the past 1 week. States is very tender to touch and painful to walk. Denies any redness, + swelling.   Patient states started after she travelled and was sitting for 5 hrs

## 2021-04-02 NOTE — ED PROVIDER NOTES
Patient Seen in: Immediate [address] [place] [address]      History   Patient presents with:  Leg Pain    Stated Complaint: Leg Pain    HPI/Subjective:   HPI    CHIEF COMPLAINT: Right leg pain    HISTORY OF PRESENT ILLNESS: Patient is a pleasant 78-year-old fe 2005 & 10/2008, both benign    • Osteoarthrosis, unspecified whether generalized or localized, unspecified site     Right Knee, Spine   • Other chest pain    • Prediabetes     per pt.     • Unspecified disorder of thyroid 9/2014    MNG   • Visual impairment Grossly intact, no deficits. Skin: warm and dry, no rashes. Musculoskeletal:  neck is supple non tender. no meningeal signs        Right lower extremity: No edema, ecchymosis, erythema or signs of cellulitic changes of skin. No lesions, rashes.   Nino Douglas None visible. COMPRESSION:  Normal compressibility, phasicity, and augmentation. OTHER:  Negative. CONCLUSION:  No acute deep vein thrombosis.     Dictated by (CST): Rivas Norris MD on 4/02/2021 at 10:51 AM     Finalized by (CST): Myla needed for pain control         Disposition and Plan     Clinical Impression:  Right leg pain  (primary encounter diagnosis)    Disposition:  Discharge  4/2/2021  9:27 am    Follow-up:  MD Christian Brumfield

## 2021-04-02 NOTE — ED QUICK NOTES
Unable to assess patient pedal pulses, as Addison Terrazas PA-C is now in the room examining the patient. She was provided shorts to change into.

## 2021-04-19 ENCOUNTER — OFFICE VISIT (OUTPATIENT)
Dept: FAMILY MEDICINE CLINIC | Facility: CLINIC | Age: 46
End: 2021-04-19

## 2021-04-19 ENCOUNTER — TELEPHONE (OUTPATIENT)
Dept: FAMILY MEDICINE CLINIC | Facility: CLINIC | Age: 46
End: 2021-04-19

## 2021-04-19 VITALS
BODY MASS INDEX: 32.2 KG/M2 | TEMPERATURE: 97 F | SYSTOLIC BLOOD PRESSURE: 106 MMHG | WEIGHT: 175 LBS | HEIGHT: 62 IN | RESPIRATION RATE: 18 BRPM | DIASTOLIC BLOOD PRESSURE: 64 MMHG | HEART RATE: 91 BPM | OXYGEN SATURATION: 96 %

## 2021-04-19 DIAGNOSIS — M79.604 LEG PAIN, LATERAL, RIGHT: Primary | ICD-10-CM

## 2021-04-19 PROCEDURE — 3008F BODY MASS INDEX DOCD: CPT | Performed by: FAMILY MEDICINE

## 2021-04-19 PROCEDURE — 99214 OFFICE O/P EST MOD 30 MIN: CPT | Performed by: FAMILY MEDICINE

## 2021-04-19 PROCEDURE — 3074F SYST BP LT 130 MM HG: CPT | Performed by: FAMILY MEDICINE

## 2021-04-19 PROCEDURE — 3078F DIAST BP <80 MM HG: CPT | Performed by: FAMILY MEDICINE

## 2021-04-19 NOTE — TELEPHONE ENCOUNTER
Patient stated she went to  where they did a doppler imaging test.    She stated that her leg is hurting worse than it did prior to having test done. She said that more areas on her same leg are hurting. She would like to know what she can do.

## 2021-04-19 NOTE — TELEPHONE ENCOUNTER
Called patient who states her right leg pain is not any better than when seen in  4/2/21 . They did a venous doppler study which was negative for DVT. Was supposed to follow up in one week but did not call for appt.   Offered appt today with Dr. Sinan Barton or

## 2021-04-19 NOTE — PROGRESS NOTES
/64   Pulse 91   Temp 96.8 °F (36 °C) (Temporal)   Resp 18   Ht 5' 2\" (1.575 m)   Wt 175 lb (79.4 kg)   LMP 03/29/2021 (Exact Date)   SpO2 96%   BMI 32.01 kg/m²               Patient presents with:  Leg Pain: right leg pain       HPI;     Td THEODORE (MULTIVITAMIN OR) Take by mouth daily. • Calcium Carbonate Antacid 500 MG Oral Chew Tab Chew 3 tablets (1,500 mg total) by mouth 3 (three) times daily with meals.  (Patient taking differently: Chew 1 tablet by mouth 2 (two) times daily.  ) 60 tablet 3 lateral, right  We discussed the differential diagnosis that may include vascular, neuro, bony pathology  -In my opinion patient may need an EMG as the pain waxes and wanes with her posture     Patient is advised to continue Tylenol for pain  I will get a

## 2021-06-18 RX ORDER — LEVOTHYROXINE SODIUM 88 UG/1
TABLET ORAL
Qty: 90 TABLET | Refills: 0 | OUTPATIENT
Start: 2021-06-18

## 2021-06-18 RX ORDER — LEVOTHYROXINE SODIUM 88 UG/1
TABLET ORAL
Qty: 30 TABLET | Refills: 0 | Status: SHIPPED | OUTPATIENT
Start: 2021-06-18 | End: 2021-07-30

## 2021-06-18 NOTE — TELEPHONE ENCOUNTER
LOV  3/23/2021    LAST LAB 3/18/21    LAST RX   Levothyroxine Sodium 88 MCG Oral Tab 90 tablet 0 3/23/2021         Next OV   Future Appointments   Date Time Provider Andreia Sanford   6/28/2021 11:00 AM Ethel Hannon MD [facility] EMG [facility]         PROTOCOL failed

## 2021-06-18 NOTE — TELEPHONE ENCOUNTER
Medication refilled for 1 month supply only. Patient is overdue to have thyroid testing done again after last change in medication dosage. Lab orders are pending. Please have patient get blood work done then schedule follow-up office visit.

## 2021-06-26 ENCOUNTER — HOSPITAL ENCOUNTER (OUTPATIENT)
Dept: MAMMOGRAPHY | Age: 46
Discharge: HOME | End: 2021-06-26
Attending: FAMILY MEDICINE
Payer: COMMERCIAL

## 2021-06-26 DIAGNOSIS — Z12.31 VISIT FOR SCREENING MAMMOGRAM: ICD-10-CM

## 2021-06-26 PROCEDURE — 77063 BREAST TOMOSYNTHESIS BI: CPT | Performed by: FAMILY MEDICINE

## 2021-06-26 PROCEDURE — 77067 SCR MAMMO BI INCL CAD: CPT | Performed by: FAMILY MEDICINE

## 2021-06-28 ENCOUNTER — TELEPHONE (OUTPATIENT)
Dept: FAMILY MEDICINE CLINIC | Facility: CLINIC | Age: 46
End: 2021-06-28

## 2021-06-28 ENCOUNTER — OFFICE VISIT (OUTPATIENT)
Dept: NEUROLOGY | Facility: CLINIC | Age: 46
End: 2021-06-28

## 2021-06-28 VITALS
HEART RATE: 84 BPM | BODY MASS INDEX: 32 KG/M2 | SYSTOLIC BLOOD PRESSURE: 106 MMHG | WEIGHT: 174.19 LBS | RESPIRATION RATE: 16 BRPM | DIASTOLIC BLOOD PRESSURE: 64 MMHG

## 2021-06-28 DIAGNOSIS — G89.29 CHRONIC MIDLINE LOW BACK PAIN WITHOUT SCIATICA: ICD-10-CM

## 2021-06-28 DIAGNOSIS — M54.10 RADICULAR LEG PAIN: ICD-10-CM

## 2021-06-28 DIAGNOSIS — M79.604 PAIN IN RIGHT LEG: Primary | ICD-10-CM

## 2021-06-28 DIAGNOSIS — M54.50 CHRONIC MIDLINE LOW BACK PAIN WITHOUT SCIATICA: ICD-10-CM

## 2021-06-28 PROCEDURE — 3074F SYST BP LT 130 MM HG: CPT | Performed by: OTHER

## 2021-06-28 PROCEDURE — 99214 OFFICE O/P EST MOD 30 MIN: CPT | Performed by: OTHER

## 2021-06-28 PROCEDURE — 3078F DIAST BP <80 MM HG: CPT | Performed by: OTHER

## 2021-06-28 RX ORDER — GABAPENTIN 100 MG/1
100 CAPSULE ORAL 2 TIMES DAILY
Qty: 60 CAPSULE | Refills: 3 | Status: SHIPPED | OUTPATIENT
Start: 2021-06-28 | End: 2021-11-16

## 2021-06-28 RX ORDER — METHYLPREDNISOLONE 4 MG/1
TABLET ORAL
Qty: 1 EACH | Refills: 0 | Status: SHIPPED | OUTPATIENT
Start: 2021-06-28 | End: 2021-11-16 | Stop reason: ALTCHOICE

## 2021-06-28 NOTE — PROGRESS NOTES
[facility] [address]   Neurology; INITIAL CLINIC VISIT  2021, 11:26 AM     Sharmin Breaux Patient Status:  No patient class for patient encounter    1975 MRN [id number]   Location [address] [facility] [place] Unspecified disorder of thyroid 9/2014    MNG   • Visual impairment     glasses as needed       PAST SURGICAL HISTORY:  Past Surgical History:   Procedure Laterality Date   • BIOPSY OF THYROID,PERCUT      benign   • BONE MARROW BIOPSY  207    benign   • HE migraine, can repeat in 4-6 hours. Do not take more than 2-3 times a week 15 tablet 0   • Tazarotene (TAZORAC) 0.1 % External Gel Apply a pea-sized amount to entire face each night.  30 g 3   • Clindamycin Phosphate 1 % External Lotion Apply a pea-sized carolyn structure of skull, no tenderness  Neck supple,  No carotid bruit,  thyroid normal  Lungs are clear to auscultation  Heart: normal SR, no murmur  Extremities:  No edema or cyanosis, radial and pedal pulse is normal.  Skin:  No unusual lesions    Neurologic without sciatica  Plan: MRI SPINE LUMBAR (CPT=72148)    (M54.10) Radicular leg pain  Plan: OP REFERRAL TO [facility]        Sreekanth:  Clinically, This  patient presented with new constant right legt pain and back pain, by history I gale

## 2021-06-28 NOTE — TELEPHONE ENCOUNTER
Patient stated she had her mammogram done Saturday. She wanted to let Dr. Jaspreet Tolbert know she is working on getting labs done this week.

## 2021-06-29 ENCOUNTER — TELEPHONE (OUTPATIENT)
Dept: NEUROLOGY | Facility: CLINIC | Age: 46
End: 2021-06-29

## 2021-06-29 ENCOUNTER — LAB ENCOUNTER (OUTPATIENT)
Dept: LAB | Age: 46
End: 2021-06-29
Attending: FAMILY MEDICINE
Payer: COMMERCIAL

## 2021-06-29 DIAGNOSIS — R73.03 PREDIABETES: ICD-10-CM

## 2021-06-29 DIAGNOSIS — E89.0 POSTSURGICAL HYPOTHYROIDISM: ICD-10-CM

## 2021-06-29 DIAGNOSIS — Z51.81 ENCOUNTER FOR MEDICATION MONITORING: ICD-10-CM

## 2021-06-29 LAB
ALBUMIN SERPL-MCNC: 3.7 G/DL (ref 3.4–5)
ALBUMIN/GLOB SERPL: 1.1 {RATIO} (ref 1–2)
ALP LIVER SERPL-CCNC: 56 U/L
ALT SERPL-CCNC: 20 U/L
ANION GAP SERPL CALC-SCNC: 7 MMOL/L (ref 0–18)
AST SERPL-CCNC: 13 U/L (ref 15–37)
BILIRUB SERPL-MCNC: 1 MG/DL (ref 0.1–2)
BUN BLD-MCNC: 10 MG/DL (ref 7–18)
BUN/CREAT SERPL: 10.8 (ref 10–20)
CALCIUM BLD-MCNC: 9.4 MG/DL (ref 8.5–10.1)
CHLORIDE SERPL-SCNC: 106 MMOL/L (ref 98–112)
CO2 SERPL-SCNC: 27 MMOL/L (ref 21–32)
CREAT BLD-MCNC: 0.93 MG/DL
EST. AVERAGE GLUCOSE BLD GHB EST-MCNC: 128 MG/DL (ref 68–126)
GLOBULIN PLAS-MCNC: 3.4 G/DL (ref 2.8–4.4)
GLUCOSE BLD-MCNC: 85 MG/DL (ref 70–99)
HBA1C MFR BLD HPLC: 6.1 % (ref ?–5.7)
M PROTEIN MFR SERPL ELPH: 7.1 G/DL (ref 6.4–8.2)
OSMOLALITY SERPL CALC.SUM OF ELEC: 288 MOSM/KG (ref 275–295)
PATIENT FASTING Y/N/NP: YES
POTASSIUM SERPL-SCNC: 3.9 MMOL/L (ref 3.5–5.1)
SODIUM SERPL-SCNC: 140 MMOL/L (ref 136–145)
T4 FREE SERPL-MCNC: 1.2 NG/DL (ref 0.8–1.7)
TSI SER-ACNC: 0.84 MIU/ML (ref 0.36–3.74)

## 2021-06-29 PROCEDURE — 84439 ASSAY OF FREE THYROXINE: CPT

## 2021-06-29 PROCEDURE — 83036 HEMOGLOBIN GLYCOSYLATED A1C: CPT

## 2021-06-29 PROCEDURE — 84443 ASSAY THYROID STIM HORMONE: CPT

## 2021-06-29 PROCEDURE — 36415 COLL VENOUS BLD VENIPUNCTURE: CPT

## 2021-06-29 PROCEDURE — 80053 COMPREHEN METABOLIC PANEL: CPT

## 2021-06-29 NOTE — TELEPHONE ENCOUNTER
Pt has used a steroid before, she has a bad reaction to it and is not a fan. She finds it overpowering Is there something else she can do, or even just use ibuprofen?      The gabapentin scares her too, doesn't want to be drowsy, is there a lighter or more

## 2021-06-29 NOTE — TELEPHONE ENCOUNTER
May use naproxen 500 mg bid for two weeks, then one a day for two weeks, then PRN,   May use Neurontin 100 mg at bedtime then, it help night sleep and relieve nerve pain.

## 2021-06-29 NOTE — TELEPHONE ENCOUNTER
Per PSR: Pt has used a steroid before, she has a bad reaction to it and is not a fan.  She finds it overpowering Is there something else she can do, or even just use ibuprofen?      The gabapentin scares her too, doesn't want to be drowsy, is there a lighte

## 2021-06-30 NOTE — TELEPHONE ENCOUNTER
Mammogram reviewed  CONCLUSION:     No suspicious change from prior mammography. No mammographic evidence of malignancy.            BI-RADS CATEGORY:     DIAGNOSTIC CATEGORY 2--BENIGN FINDING:         RECOMMENDATIONS:     ROUTINE MAMMOGRAM AND CLINICAL RACHAEL

## 2021-07-09 ENCOUNTER — HOSPITAL ENCOUNTER (OUTPATIENT)
Dept: MRI IMAGING | Age: 46
Discharge: HOME | End: 2021-07-09
Attending: Other
Payer: COMMERCIAL

## 2021-07-09 DIAGNOSIS — G89.29 CHRONIC MIDLINE LOW BACK PAIN WITHOUT SCIATICA: ICD-10-CM

## 2021-07-09 DIAGNOSIS — M54.50 CHRONIC MIDLINE LOW BACK PAIN WITHOUT SCIATICA: ICD-10-CM

## 2021-07-09 PROCEDURE — 72148 MRI LUMBAR SPINE W/O DYE: CPT | Performed by: OTHER

## 2021-07-12 ENCOUNTER — TELEPHONE (OUTPATIENT)
Dept: NEUROLOGY | Facility: CLINIC | Age: 46
End: 2021-07-12

## 2021-07-12 NOTE — TELEPHONE ENCOUNTER
Relayed below to pt. Verbalized understanding, no further questions.    ----- Message from Isidoro Silver MD sent at 7/12/2021  8:31 AM CDT -----  MRI L.  Spine indeed showed disc disease, continue same treatment I recommended at visit,   Will review film at fol

## 2021-07-20 ENCOUNTER — HOSPITAL ENCOUNTER (OUTPATIENT)
Dept: ULTRASOUND IMAGING | Age: 46
Discharge: HOME | End: 2021-07-20
Attending: FAMILY MEDICINE
Payer: COMMERCIAL

## 2021-07-20 DIAGNOSIS — N85.2 BULKY OR ENLARGED UTERUS: ICD-10-CM

## 2021-07-20 PROCEDURE — 76830 TRANSVAGINAL US NON-OB: CPT | Performed by: FAMILY MEDICINE

## 2021-07-20 PROCEDURE — 76856 US EXAM PELVIC COMPLETE: CPT | Performed by: FAMILY MEDICINE

## 2021-07-26 ENCOUNTER — TELEPHONE (OUTPATIENT)
Dept: FAMILY MEDICINE CLINIC | Facility: CLINIC | Age: 46
End: 2021-07-26

## 2021-07-26 NOTE — TELEPHONE ENCOUNTER
Lance Blizzard, MD   7/26/2021  3:46 PM CDT Back to Top      Please notify patient pelvic ultrasound shows findings of small uterine fibroid (benign). Ovaries WNL. No concerning findings.

## 2021-07-29 ENCOUNTER — TELEPHONE (OUTPATIENT)
Dept: FAMILY MEDICINE CLINIC | Facility: CLINIC | Age: 46
End: 2021-07-29

## 2021-07-29 RX ORDER — ONDANSETRON 4 MG/1
4 TABLET, FILM COATED ORAL EVERY 8 HOURS PRN
Qty: 20 TABLET | Refills: 0 | Status: SHIPPED | OUTPATIENT
Start: 2021-07-29 | End: 2022-01-11 | Stop reason: ALTCHOICE

## 2021-07-29 NOTE — TELEPHONE ENCOUNTER
Patient stated she had an [address] done 7/20. She said that ever since then, she has not felt good. She said it was very uncomfortable having the US and said the technician was aware. She also stated that her abdomen is distended.     She said she is nauseou

## 2021-07-29 NOTE — TELEPHONE ENCOUNTER
Called patient who states she has abdominal discomfort and nausea since transvaginal US. Not eating or drinking much. Also has HA. Denies fever, V/D. Having normal BMs, not constipated. Denies and vaginal bleeding.  States she took ibuprofen today and he

## 2021-07-29 NOTE — TELEPHONE ENCOUNTER
Please send prescription to pharmacy for Zofran 4mg every 8 hours prn nausea. #20 no refills. If she is not better have her call office in AM and will try to add her to end of my schedule if possible. If better than can keep appointment as scheduled.  If an

## 2021-07-29 NOTE — TELEPHONE ENCOUNTER
Called patient and advised of rx and recommendations per Dr. Jewel Ruiz note. States she is not feeling any better and is having more stomach discomfort. Advised since her symptoms are worse, she should go to the ED for evaluation.   Verbalizes understandin

## 2021-07-29 NOTE — TELEPHONE ENCOUNTER
Patient called office again, has not heard back yet from anyone. Would like a call back today please.

## 2021-07-30 RX ORDER — LEVOTHYROXINE SODIUM 88 UG/1
TABLET ORAL
Qty: 30 TABLET | Refills: 1 | Status: SHIPPED | OUTPATIENT
Start: 2021-07-30 | End: 2021-09-25

## 2021-07-30 NOTE — TELEPHONE ENCOUNTER
Thyroid Supplements Protocol [id number]/29/2021 07:59 PM   TSH test in past 12 months Protocol Details    TSH value between 0.350 and 5.500 IU/ml     Appointment in past 12 or next 3 months      LOV  3/23/21     LAST LAB  6/29/21     LAST RX 6/18/21 30 tabs

## 2021-07-30 NOTE — TELEPHONE ENCOUNTER
No ER visit noted. pls call to see how pt is doing. Should schedule appt if not better  Can use HFU or SDA slots if needed.

## 2021-08-02 NOTE — TELEPHONE ENCOUNTER
Spoke to patient. She did not go to the ER and she cancelled her appt for today because she is feeling better. No longer nauseous (only took Zofran once) and is able to keep food down.      Notes that she still has abdominal cramping and pain with eating bu

## 2021-08-02 NOTE — TELEPHONE ENCOUNTER
Please schedule appointment. She should have kept appointment if still having even some GI sxs. It is very difficult to advise further on persistent GI sxs without doing an ov.

## 2021-08-02 NOTE — TELEPHONE ENCOUNTER
No appts available this week. OK to take SDA or HFU slot next week or can she see Dannie Franco for this?

## 2021-08-03 NOTE — TELEPHONE ENCOUNTER
Future Appointments   Date Time Provider Andreia Sanford   8/4/2021 12:00 PM JUAN Marte EM[facility]

## 2021-09-25 RX ORDER — LEVOTHYROXINE SODIUM 88 UG/1
TABLET ORAL
Qty: 90 TABLET | Refills: 0 | Status: SHIPPED | OUTPATIENT
Start: 2021-09-25 | End: 2021-12-20

## 2021-09-25 RX ORDER — LEVOTHYROXINE SODIUM 88 UG/1
TABLET ORAL
Qty: 30 TABLET | Refills: 1 | Status: SHIPPED | OUTPATIENT
Start: 2021-09-25 | End: 2021-09-25

## 2021-09-25 NOTE — TELEPHONE ENCOUNTER
Thyroid Supplements Protocol Passed 09/24/2021 04:58 PM   Protocol Details  TSH test in past 12 months    TSH value between 0.350 and 5.500 IU/ml    Appointment in past 12 or next 3 months     LOV 4/16/21     LAST LAB  6/29/21    LAST RX  7/30/21 30 with 1

## 2021-10-20 ENCOUNTER — TELEPHONE (OUTPATIENT)
Dept: FAMILY MEDICINE CLINIC | Facility: CLINIC | Age: 46
End: 2021-10-20

## 2021-10-20 NOTE — TELEPHONE ENCOUNTER
Has degenerative disc disease. Yesterday and today Left side lower back is radiating down to hip and lt leg. In a lot of pain, ibuprofen is not helping, nor is heat.     Please advise

## 2021-10-20 NOTE — TELEPHONE ENCOUNTER
Called patient who states her back pain is now on the left lower back radiating to left hip and inner left leg and groin. States it is so bad she can't sit or drive. Previously her pain was more on the right side.   Did not follow up with EMG test or with

## 2021-10-22 ENCOUNTER — TELEPHONE (OUTPATIENT)
Dept: NEUROLOGY | Facility: CLINIC | Age: 46
End: 2021-10-22

## 2021-10-22 DIAGNOSIS — M54.10 RADICULAR LEG PAIN: Primary | ICD-10-CM

## 2021-10-22 DIAGNOSIS — M79.604 PAIN IN RIGHT LEG: ICD-10-CM

## 2021-10-22 NOTE — TELEPHONE ENCOUNTER
Pt seen 6/28/21 as new pt for leg pain from unknown cause. Note is below. Pt states she has noticed left leg is now also hurting, at times worse than the right.   Routed to provider to advise if BLE EMG is okay to be ordered

## 2021-10-22 NOTE — TELEPHONE ENCOUNTER
Pt has to [facility] EMG; would like both legs (currently states only 1 leg)  Left leg worse than right leg. Please call pt if new order is placed so that she can call to [facility].

## 2021-10-25 NOTE — TELEPHONE ENCOUNTER
Original EMG order edited to reflect 2 extremities instead of 1 extremity. Patient notified and states she is in a lot of pain.  Patient states she has been taking Ibuprofen, Tylenol ES, using Icy Hot with Lidocaine, heat, ice and is not getting much relie

## 2021-10-25 NOTE — TELEPHONE ENCOUNTER
Seth Barbour MD  [facility] [place] Nurse 10 minutes ago (10:52 AM)         I called her back, she did not take Neurontin I give afraid of side effect, she wants to try alternative treatment natural remedy, then I can not help much in natural remedy.    Do PT, sh

## 2021-11-16 ENCOUNTER — OFFICE VISIT (OUTPATIENT)
Dept: NEUROLOGY | Facility: CLINIC | Age: 46
End: 2021-11-16
Payer: COMMERCIAL

## 2021-11-16 VITALS
RESPIRATION RATE: 16 BRPM | BODY MASS INDEX: 32 KG/M2 | WEIGHT: 174 LBS | SYSTOLIC BLOOD PRESSURE: 126 MMHG | HEART RATE: 68 BPM | DIASTOLIC BLOOD PRESSURE: 72 MMHG

## 2021-11-16 DIAGNOSIS — M47.26 OSTEOARTHRITIS OF SPINE WITH RADICULOPATHY, LUMBAR REGION: ICD-10-CM

## 2021-11-16 DIAGNOSIS — M54.42 CHRONIC MIDLINE LOW BACK PAIN WITH BILATERAL SCIATICA: ICD-10-CM

## 2021-11-16 DIAGNOSIS — M79.604 PAIN IN RIGHT LEG: ICD-10-CM

## 2021-11-16 DIAGNOSIS — G89.29 CHRONIC MIDLINE LOW BACK PAIN WITH BILATERAL SCIATICA: ICD-10-CM

## 2021-11-16 DIAGNOSIS — M54.10 RADICULAR LEG PAIN: Primary | ICD-10-CM

## 2021-11-16 DIAGNOSIS — G43.109 COMPLICATED MIGRAINE: ICD-10-CM

## 2021-11-16 DIAGNOSIS — M54.41 CHRONIC MIDLINE LOW BACK PAIN WITH BILATERAL SCIATICA: ICD-10-CM

## 2021-11-16 PROCEDURE — 99214 OFFICE O/P EST MOD 30 MIN: CPT | Performed by: OTHER

## 2021-11-16 PROCEDURE — 3078F DIAST BP <80 MM HG: CPT | Performed by: OTHER

## 2021-11-16 PROCEDURE — 3074F SYST BP LT 130 MM HG: CPT | Performed by: OTHER

## 2021-11-16 NOTE — PROGRESS NOTES
[address]   Neurology; follow up  CLINIC VISIT  2021    Julia Schaumann Patient Status:  No patient class for patient encounter    1975 MRN [id number]   Location [facility], [address], [address] [place] [address] PCP Migraine    • Nontoxic multinodular goiter     s/p thyroid bx 2005 & 10/2008, both benign    • Osteoarthrosis, unspecified whether generalized or localized, unspecified site     Right Knee, Spine   • Other chest pain    • Prediabetes     per pt.     • Unspe (TAZORAC) 0.1 % External Gel Apply a pea-sized amount to entire face each night. 30 g 3   • Clindamycin Phosphate 1 % External Lotion Apply a pea-sized amount to entire face each morning. 60 mL 3   • Multiple Vitamin (MULTIVITAMIN OR) Take by mouth daily. kg/m². General:  Patient is a 50year old female in no acute distress. appearance: Normal developed and well nourished , in no acute stress;   HEENT:  Normal conjunctiva, no abnormal secretion, normal structure of skull, no tenderness  Neck supple,  No ca neural foraminal stenosis at L3-4.  Mild to moderate bilateral neural foraminal stenosis at L4-5.  Mild bilateral neural foraminal stenosis at L5-S1.       3.  Facet arthropathy throughout the lumbar spine, most pronounced at L4-5.       Please see above fo current symptoms worse or significant new symptoms arise. They understood my instruction.      Andres De La Cruz MD  General Neurology   Neuromuscular/ Electrodiagnostic Specialist  [facility]  11/16/2021

## 2021-12-20 RX ORDER — LEVOTHYROXINE SODIUM 88 UG/1
TABLET ORAL
Qty: 30 TABLET | Refills: 0 | Status: SHIPPED | OUTPATIENT
Start: 2021-12-20 | End: 2022-01-11

## 2021-12-20 RX ORDER — LEVOTHYROXINE SODIUM 88 UG/1
TABLET ORAL
Qty: 90 TABLET | Refills: 0 | OUTPATIENT
Start: 2021-12-20

## 2021-12-20 NOTE — TELEPHONE ENCOUNTER
Return in about 2 months (around 5/23/2021) for Follow-up Medication, Follow-up Test Results.     Please schedule

## 2022-01-11 ENCOUNTER — OFFICE VISIT (OUTPATIENT)
Dept: FAMILY MEDICINE CLINIC | Facility: CLINIC | Age: 47
End: 2022-01-11
Payer: COMMERCIAL

## 2022-01-11 VITALS
OXYGEN SATURATION: 98 % | TEMPERATURE: 99 F | RESPIRATION RATE: 16 BRPM | BODY MASS INDEX: 31.98 KG/M2 | HEART RATE: 88 BPM | SYSTOLIC BLOOD PRESSURE: 110 MMHG | DIASTOLIC BLOOD PRESSURE: 62 MMHG | HEIGHT: 62.48 IN | WEIGHT: 178.25 LBS

## 2022-01-11 DIAGNOSIS — D72.819 LEUKOPENIA, UNSPECIFIED TYPE: ICD-10-CM

## 2022-01-11 DIAGNOSIS — M47.26 OSTEOARTHRITIS OF SPINE WITH RADICULOPATHY, LUMBAR REGION: ICD-10-CM

## 2022-01-11 DIAGNOSIS — D25.1 INTRAMURAL LEIOMYOMA OF UTERUS: ICD-10-CM

## 2022-01-11 DIAGNOSIS — Z23 NEED FOR VACCINATION: ICD-10-CM

## 2022-01-11 DIAGNOSIS — Z51.81 ENCOUNTER FOR MEDICATION MONITORING: ICD-10-CM

## 2022-01-11 DIAGNOSIS — G43.009 MIGRAINE WITHOUT AURA AND WITHOUT STATUS MIGRAINOSUS, NOT INTRACTABLE: ICD-10-CM

## 2022-01-11 DIAGNOSIS — R74.8 ELEVATED VITAMIN B12 LEVEL: ICD-10-CM

## 2022-01-11 DIAGNOSIS — E89.0 POSTSURGICAL HYPOTHYROIDISM: Primary | ICD-10-CM

## 2022-01-11 DIAGNOSIS — Z13.220 SCREENING, LIPID: ICD-10-CM

## 2022-01-11 DIAGNOSIS — R73.03 PREDIABETES: ICD-10-CM

## 2022-01-11 DIAGNOSIS — L70.0 ACNE VULGARIS: ICD-10-CM

## 2022-01-11 PROCEDURE — 3078F DIAST BP <80 MM HG: CPT | Performed by: FAMILY MEDICINE

## 2022-01-11 PROCEDURE — 90471 IMMUNIZATION ADMIN: CPT | Performed by: FAMILY MEDICINE

## 2022-01-11 PROCEDURE — 3074F SYST BP LT 130 MM HG: CPT | Performed by: FAMILY MEDICINE

## 2022-01-11 PROCEDURE — 90686 IIV4 VACC NO PRSV 0.5 ML IM: CPT | Performed by: FAMILY MEDICINE

## 2022-01-11 PROCEDURE — 3008F BODY MASS INDEX DOCD: CPT | Performed by: FAMILY MEDICINE

## 2022-01-11 PROCEDURE — 99214 OFFICE O/P EST MOD 30 MIN: CPT | Performed by: FAMILY MEDICINE

## 2022-01-11 RX ORDER — LEVOTHYROXINE SODIUM 88 UG/1
88 TABLET ORAL
Qty: 30 TABLET | Refills: 0 | Status: CANCELLED | OUTPATIENT
Start: 2022-01-11

## 2022-01-11 RX ORDER — LEVOTHYROXINE SODIUM 88 UG/1
88 TABLET ORAL
Qty: 30 TABLET | Refills: 0 | Status: SHIPPED | OUTPATIENT
Start: 2022-01-11

## 2022-01-12 RX ORDER — LEVOTHYROXINE SODIUM 88 UG/1
TABLET ORAL
Qty: 90 TABLET | Refills: 0 | OUTPATIENT
Start: 2022-01-12

## 2022-01-12 NOTE — PROGRESS NOTES
Savana Pena is a 48year old female. HPI:  Pt is here for f/u on meds  Feeling fine on current levothyroxine dose. Menses regular  Getting shorter, last one was 3 days long.   Normal flow  Has DJD with pain in legs  Intermittent tingling in legs  Pa ectopic after Delivery 1991 with D&C   • OTHER SURGICAL HISTORY  10/2015    IR Cerebral Angiogram: No intracranial Aneurysm   • THYROIDECTOMY  02/20/2020         Social History    Tobacco Use      Smoking status: Never Smoker      Smokeless tobacco: Never levothyroxine 88 MCG Oral Tab; Take 1 tablet (88 mcg total) by mouth before breakfast.  Dispense: 30 tablet; Refill: 0  - TSH+FREE T4; Future    2. Need for vaccination  Reviewed indication.  Administered today.  - FLULAVAL INFLUENZA VACCINE QUAD PRESERVATI VITAMIN B12; Future

## 2022-01-29 ENCOUNTER — HOSPITAL ENCOUNTER (OUTPATIENT)
Age: 47
Discharge: HOME | End: 2022-01-29
Attending: EMERGENCY MEDICINE
Payer: COMMERCIAL

## 2022-01-29 VITALS
RESPIRATION RATE: 20 BRPM | BODY MASS INDEX: 31.94 KG/M2 | HEIGHT: 62.5 IN | DIASTOLIC BLOOD PRESSURE: 65 MMHG | SYSTOLIC BLOOD PRESSURE: 125 MMHG | HEART RATE: 88 BPM | OXYGEN SATURATION: 98 % | WEIGHT: 178 LBS | TEMPERATURE: 99 F

## 2022-01-29 DIAGNOSIS — Z20.822 CLOSE EXPOSURE TO COVID-19 VIRUS: Primary | ICD-10-CM

## 2022-01-29 LAB — SARS-COV-2 RNA RESP QL NAA+PROBE: NOT DETECTED

## 2022-01-29 PROCEDURE — 99212 OFFICE O/P EST SF 10 MIN: CPT

## 2022-01-29 PROCEDURE — 99213 OFFICE O/P EST LOW 20 MIN: CPT

## 2022-01-30 PROBLEM — M47.26 OSTEOARTHRITIS OF SPINE WITH RADICULOPATHY, LUMBAR REGION: Status: ACTIVE | Noted: 2022-01-30

## 2022-02-28 RX ORDER — LEVOTHYROXINE SODIUM 88 UG/1
TABLET ORAL
Qty: 90 TABLET | Refills: 0 | Status: SHIPPED | OUTPATIENT
Start: 2022-02-28

## 2022-05-25 DIAGNOSIS — E89.0 POSTSURGICAL HYPOTHYROIDISM: ICD-10-CM

## 2022-05-25 RX ORDER — LEVOTHYROXINE SODIUM 88 UG/1
TABLET ORAL
Qty: 30 TABLET | Refills: 1 | Status: SHIPPED | OUTPATIENT
Start: 2022-05-25

## 2022-05-25 NOTE — TELEPHONE ENCOUNTER
LOV 1/11/2022    LAST LAB 6/29/21    LAST RX   LEVOTHYROXINE 88 MCG Oral Tab 90 tablet 0 2/28/2022         Next OV No future appointments.       PROTOCOL pass

## 2022-05-26 ENCOUNTER — HOSPITAL ENCOUNTER (EMERGENCY)
Facility: HOSPITAL | Age: 47
Discharge: HOME | End: 2022-05-26
Attending: EMERGENCY MEDICINE
Payer: COMMERCIAL

## 2022-05-26 ENCOUNTER — APPOINTMENT (OUTPATIENT)
Dept: GENERAL RADIOLOGY | Facility: HOSPITAL | Age: 47
End: 2022-05-26
Attending: EMERGENCY MEDICINE
Payer: COMMERCIAL

## 2022-05-26 ENCOUNTER — TELEPHONE (OUTPATIENT)
Dept: FAMILY MEDICINE CLINIC | Facility: CLINIC | Age: 47
End: 2022-05-26

## 2022-05-26 VITALS
TEMPERATURE: 99 F | HEART RATE: 59 BPM | WEIGHT: 178 LBS | BODY MASS INDEX: 31.54 KG/M2 | RESPIRATION RATE: 16 BRPM | OXYGEN SATURATION: 100 % | DIASTOLIC BLOOD PRESSURE: 85 MMHG | HEIGHT: 63 IN | SYSTOLIC BLOOD PRESSURE: 143 MMHG

## 2022-05-26 DIAGNOSIS — G43.909 MIGRAINE WITHOUT STATUS MIGRAINOSUS, NOT INTRACTABLE, UNSPECIFIED MIGRAINE TYPE: Primary | ICD-10-CM

## 2022-05-26 DIAGNOSIS — G43.009 MIGRAINE WITHOUT AURA AND WITHOUT STATUS MIGRAINOSUS, NOT INTRACTABLE: Primary | ICD-10-CM

## 2022-05-26 DIAGNOSIS — R07.9 CHEST PAIN WITH LOW RISK FOR CARDIAC ETIOLOGY: ICD-10-CM

## 2022-05-26 DIAGNOSIS — G43.719 INTRACTABLE CHRONIC MIGRAINE WITHOUT AURA AND WITHOUT STATUS MIGRAINOSUS: ICD-10-CM

## 2022-05-26 LAB
ALBUMIN SERPL-MCNC: 3.7 G/DL (ref 3.4–5)
ALBUMIN/GLOB SERPL: 1 {RATIO} (ref 1–2)
ALP LIVER SERPL-CCNC: 60 U/L
ALT SERPL-CCNC: 17 U/L
ANION GAP SERPL CALC-SCNC: 3 MMOL/L (ref 0–18)
AST SERPL-CCNC: 11 U/L (ref 15–37)
ATRIAL RATE: 85 BPM
BASOPHILS # BLD AUTO: 0.02 X10(3) UL (ref 0–0.2)
BASOPHILS NFR BLD AUTO: 0.6 %
BILIRUB SERPL-MCNC: 0.6 MG/DL (ref 0.1–2)
BUN BLD-MCNC: 12 MG/DL (ref 7–18)
CALCIUM BLD-MCNC: 8.8 MG/DL (ref 8.5–10.1)
CHLORIDE SERPL-SCNC: 109 MMOL/L (ref 98–112)
CO2 SERPL-SCNC: 26 MMOL/L (ref 21–32)
CREAT BLD-MCNC: 0.9 MG/DL
EOSINOPHIL # BLD AUTO: 0.06 X10(3) UL (ref 0–0.7)
EOSINOPHIL NFR BLD AUTO: 1.9 %
ERYTHROCYTE [DISTWIDTH] IN BLOOD BY AUTOMATED COUNT: 12.5 %
GLOBULIN PLAS-MCNC: 3.7 G/DL (ref 2.8–4.4)
GLUCOSE BLD-MCNC: 94 MG/DL (ref 70–99)
HCT VFR BLD AUTO: 37.1 %
HGB BLD-MCNC: 12.8 G/DL
IMM GRANULOCYTES # BLD AUTO: 0 X10(3) UL (ref 0–1)
IMM GRANULOCYTES NFR BLD: 0 %
LYMPHOCYTES # BLD AUTO: 1.45 X10(3) UL (ref 1–4)
LYMPHOCYTES NFR BLD AUTO: 46.8 %
MCH RBC QN AUTO: 33.6 PG (ref 26–34)
MCHC RBC AUTO-ENTMCNC: 34.5 G/DL (ref 31–37)
MCV RBC AUTO: 97.4 FL
MONOCYTES # BLD AUTO: 0.31 X10(3) UL (ref 0.1–1)
MONOCYTES NFR BLD AUTO: 10 %
NEUTROPHILS # BLD AUTO: 1.26 X10 (3) UL (ref 1.5–7.7)
NEUTROPHILS # BLD AUTO: 1.26 X10(3) UL (ref 1.5–7.7)
NEUTROPHILS NFR BLD AUTO: 40.7 %
OSMOLALITY SERPL CALC.SUM OF ELEC: 286 MOSM/KG (ref 275–295)
P AXIS: 53 DEGREES
P-R INTERVAL: 144 MS
PLATELET # BLD AUTO: 224 10(3)UL (ref 150–450)
POTASSIUM SERPL-SCNC: 3.7 MMOL/L (ref 3.5–5.1)
PROT SERPL-MCNC: 7.4 G/DL (ref 6.4–8.2)
Q-T INTERVAL: 370 MS
QRS DURATION: 82 MS
QTC CALCULATION (BEZET): 440 MS
R AXIS: 16 DEGREES
RBC # BLD AUTO: 3.81 X10(6)UL
SODIUM SERPL-SCNC: 138 MMOL/L (ref 136–145)
T AXIS: 31 DEGREES
TROPONIN I HIGH SENSITIVITY: <3 NG/L
VENTRICULAR RATE: 85 BPM
WBC # BLD AUTO: 3.1 X10(3) UL (ref 4–11)

## 2022-05-26 PROCEDURE — 96375 TX/PRO/DX INJ NEW DRUG ADDON: CPT

## 2022-05-26 PROCEDURE — 84484 ASSAY OF TROPONIN QUANT: CPT | Performed by: EMERGENCY MEDICINE

## 2022-05-26 PROCEDURE — 85025 COMPLETE CBC W/AUTO DIFF WBC: CPT | Performed by: EMERGENCY MEDICINE

## 2022-05-26 PROCEDURE — 99285 EMERGENCY DEPT VISIT HI MDM: CPT

## 2022-05-26 PROCEDURE — 71045 X-RAY EXAM CHEST 1 VIEW: CPT | Performed by: EMERGENCY MEDICINE

## 2022-05-26 PROCEDURE — 96374 THER/PROPH/DIAG INJ IV PUSH: CPT

## 2022-05-26 PROCEDURE — 93010 ELECTROCARDIOGRAM REPORT: CPT

## 2022-05-26 PROCEDURE — 80053 COMPREHEN METABOLIC PANEL: CPT | Performed by: EMERGENCY MEDICINE

## 2022-05-26 PROCEDURE — 96361 HYDRATE IV INFUSION ADD-ON: CPT

## 2022-05-26 PROCEDURE — 93005 ELECTROCARDIOGRAM TRACING: CPT

## 2022-05-26 PROCEDURE — 99284 EMERGENCY DEPT VISIT MOD MDM: CPT

## 2022-05-26 RX ORDER — ONDANSETRON 2 MG/ML
4 INJECTION INTRAMUSCULAR; INTRAVENOUS ONCE
Status: COMPLETED | OUTPATIENT
Start: 2022-05-26 | End: 2022-05-26

## 2022-05-26 RX ORDER — BUTALBITAL, ACETAMINOPHEN AND CAFFEINE 50; 325; 40 MG/1; MG/1; MG/1
1-2 TABLET ORAL EVERY 6 HOURS PRN
Qty: 10 TABLET | Refills: 0 | Status: SHIPPED | OUTPATIENT
Start: 2022-05-26 | End: 2022-05-31

## 2022-05-26 RX ORDER — ASPIRIN 81 MG/1
324 TABLET, CHEWABLE ORAL ONCE
Status: COMPLETED | OUTPATIENT
Start: 2022-05-26 | End: 2022-05-26

## 2022-05-26 RX ORDER — MORPHINE SULFATE 4 MG/ML
4 INJECTION, SOLUTION INTRAMUSCULAR; INTRAVENOUS ONCE
Status: DISCONTINUED | OUTPATIENT
Start: 2022-05-26 | End: 2022-05-26

## 2022-05-26 RX ORDER — DEXAMETHASONE SODIUM PHOSPHATE 10 MG/ML
10 INJECTION, SOLUTION INTRAMUSCULAR; INTRAVENOUS ONCE
Status: COMPLETED | OUTPATIENT
Start: 2022-05-26 | End: 2022-05-26

## 2022-05-26 NOTE — TELEPHONE ENCOUNTER
Patient stated that she has had a headache for last 3 days and now right hand is going numb. She thinks it is a migraine. She would like to speak with the nurse about what she can do.

## 2022-05-26 NOTE — ED INITIAL ASSESSMENT (HPI)
Hx of TIA  States \"I typically have migraines when I have my periods but today, I developed numbness to my right hand, then my left hand that now radiates up my left arm. While in the waiting room, I developed numbness to my left leg. Now I have persistent chest pain, very sharp. And I have numbness to my left side of my face by my jaw\"   Denies sob. Blurry vision. Reports migraine is severe with photosensitivity.    No facial droop, no slurred speech, equal arm strengths

## 2022-05-26 NOTE — TELEPHONE ENCOUNTER
Patient called back again. States she is on her way to [facility] ED. States is having a migraine headache. They are triggered by her period which started on Tuesday. She also noted she was having numbness in her right hand then later started having numbness on the left side of her face. These are not her typical migraine symptoms. Noted a couple days ago she had mid-chest pain. Thought if was heartburn because she had been eating some spicy foods. Notes she is having mid-chest pain today also. Advised she needs to be evaluated in the ED. Dr. Inez Triplett is not in the office. States she is pulling into the ED parking lot now. Advised we will watch her chart for their assessment and recommendations.

## 2022-05-27 ENCOUNTER — TELEPHONE (OUTPATIENT)
Dept: SURGERY | Facility: CLINIC | Age: 47
End: 2022-05-27

## 2022-05-27 ENCOUNTER — HOSPITAL ENCOUNTER (OUTPATIENT)
Dept: MRI IMAGING | Facility: HOSPITAL | Age: 47
Discharge: HOME | End: 2022-05-27
Attending: Other
Payer: COMMERCIAL

## 2022-05-27 DIAGNOSIS — R20.0 LEFT SIDED NUMBNESS: Primary | ICD-10-CM

## 2022-05-27 DIAGNOSIS — R20.0 LEFT SIDED NUMBNESS: ICD-10-CM

## 2022-05-27 PROCEDURE — 70551 MRI BRAIN STEM W/O DYE: CPT | Performed by: OTHER

## 2022-05-27 RX ORDER — METHYLPREDNISOLONE 4 MG/1
TABLET ORAL
Qty: 1 EACH | Refills: 0 | Status: SHIPPED | OUTPATIENT
Start: 2022-05-27

## 2022-05-27 NOTE — TELEPHONE ENCOUNTER
Dr Drew Hill nurse, Ashok Barney, calling to discuss pt's symptoms post ER visit yesterday. Transferred call to nurse.

## 2022-05-27 NOTE — ED QUICK NOTES
Report from SHWETA KELLY. Pt resting in bed, states pain, \"tolerable, chest like a 3, head like a 5.\" She denies RON. Warm blankets provided.  Pt denies further need, call light in reach

## 2022-05-27 NOTE — TELEPHONE ENCOUNTER
Dr. Bernabe Collins,  Please advise    No open appts with any provider  Other than Dr. Earlene Munoz for ED F/U next week. You have a 20 min opening 6/6/22. Is it OK to schedule patient there and advise to schedule F/U with Dr. Jeannette Nunez? LOV with her was 3/8/21 and was instructed to F/U in 3 months. Updated referral pended for your review.

## 2022-05-27 NOTE — TELEPHONE ENCOUNTER
Patient stated that after being in ED yesterday, she still has the migraine and numbness in hands, arms and leg. Also she still has the chest pain. She said they prescribed:  Prednisone  Migraine medicine  And something else    BP was back to normal but sensation in chin and neck is still happing as well.

## 2022-05-27 NOTE — TELEPHONE ENCOUNTER
Spoke to patient. Left arm and leg numbness and right arm numbness. Migraine persistent. Recommend medrol dose pack and stat MRI brain to rule out a stroke. She will call back office for instructions on the MRI.

## 2022-05-27 NOTE — TELEPHONE ENCOUNTER
Called patient to verify she can be flexible with any appointment time with Neurology if we are able to get her in. States yes, she is available for any appointment. Tommie Cook  Neurology, Dr. Anthony Mendoza office, spoke to nurse and explained situation and request for ED follow up per Dr. Nicole Greer. States their office closes at [place] today, but she will send message to Dr. Cecilia Nobles now. Either Dr. Cecilia Nobles or nurse will call and follow up with patient per phone today to discuss symptoms and plan of care. Called and updated patient and advised to expect a call back today from Dr. Cecilia Nobles or the nurse. Advised if symptoms do worsen or she develops dizziness, slurred speech, visual changes, etc. She should return to the ED. Verbalizes understanding.     Chay Mckinneymarck

## 2022-05-27 NOTE — TELEPHONE ENCOUNTER
With ongoing headache and neuropathy sxs,  would like pt to see Neuro ASAP. Is she able to get an appt with any of the Neurologist's today?    Referral signed

## 2022-05-27 NOTE — TELEPHONE ENCOUNTER
Sophie Brandt RN reporting pt c/o possible complicated migraine for 3-4 days with ED visit, reports headache improved but numbness worsening. Pt requesting to speak to provider.   Routed to provider to advise

## 2022-06-06 ENCOUNTER — OFFICE VISIT (OUTPATIENT)
Dept: FAMILY MEDICINE CLINIC | Facility: CLINIC | Age: 47
End: 2022-06-06
Payer: COMMERCIAL

## 2022-06-06 VITALS
DIASTOLIC BLOOD PRESSURE: 64 MMHG | RESPIRATION RATE: 16 BRPM | BODY MASS INDEX: 29.95 KG/M2 | WEIGHT: 169 LBS | TEMPERATURE: 98 F | HEART RATE: 98 BPM | OXYGEN SATURATION: 97 % | HEIGHT: 63 IN | SYSTOLIC BLOOD PRESSURE: 96 MMHG

## 2022-06-06 DIAGNOSIS — M94.0 COSTOCHONDRITIS: ICD-10-CM

## 2022-06-06 DIAGNOSIS — G43.109 COMPLICATED MIGRAINE: Primary | ICD-10-CM

## 2022-06-06 DIAGNOSIS — E89.0 POSTSURGICAL HYPOTHYROIDISM: ICD-10-CM

## 2022-06-06 PROCEDURE — 99215 OFFICE O/P EST HI 40 MIN: CPT | Performed by: FAMILY MEDICINE

## 2022-06-06 PROCEDURE — 3078F DIAST BP <80 MM HG: CPT | Performed by: FAMILY MEDICINE

## 2022-06-06 PROCEDURE — 3008F BODY MASS INDEX DOCD: CPT | Performed by: FAMILY MEDICINE

## 2022-06-06 PROCEDURE — 3074F SYST BP LT 130 MM HG: CPT | Performed by: FAMILY MEDICINE

## 2022-06-27 ENCOUNTER — LAB ENCOUNTER (OUTPATIENT)
Dept: LAB | Age: 47
End: 2022-06-27
Attending: FAMILY MEDICINE
Payer: COMMERCIAL

## 2022-06-27 DIAGNOSIS — R74.8 ELEVATED VITAMIN B12 LEVEL: ICD-10-CM

## 2022-06-27 DIAGNOSIS — D72.819 LEUKOPENIA, UNSPECIFIED TYPE: ICD-10-CM

## 2022-06-27 DIAGNOSIS — Z51.81 ENCOUNTER FOR MEDICATION MONITORING: ICD-10-CM

## 2022-06-27 DIAGNOSIS — R73.03 PREDIABETES: ICD-10-CM

## 2022-06-27 DIAGNOSIS — Z13.220 SCREENING, LIPID: ICD-10-CM

## 2022-06-27 DIAGNOSIS — E89.0 POSTSURGICAL HYPOTHYROIDISM: ICD-10-CM

## 2022-06-27 LAB
ALBUMIN SERPL-MCNC: 3.6 G/DL (ref 3.4–5)
ALBUMIN/GLOB SERPL: 1 {RATIO} (ref 1–2)
ALP LIVER SERPL-CCNC: 65 U/L
ALT SERPL-CCNC: 21 U/L
ANION GAP SERPL CALC-SCNC: 6 MMOL/L (ref 0–18)
AST SERPL-CCNC: 8 U/L (ref 15–37)
BASOPHILS # BLD AUTO: 0.03 X10(3) UL (ref 0–0.2)
BASOPHILS NFR BLD AUTO: 0.8 %
BILIRUB SERPL-MCNC: 0.7 MG/DL (ref 0.1–2)
BUN BLD-MCNC: 9 MG/DL (ref 7–18)
CALCIUM BLD-MCNC: 8.9 MG/DL (ref 8.5–10.1)
CHLORIDE SERPL-SCNC: 105 MMOL/L (ref 98–112)
CHOLEST SERPL-MCNC: 175 MG/DL (ref ?–200)
CO2 SERPL-SCNC: 27 MMOL/L (ref 21–32)
CREAT BLD-MCNC: 0.92 MG/DL
EOSINOPHIL # BLD AUTO: 0.06 X10(3) UL (ref 0–0.7)
EOSINOPHIL NFR BLD AUTO: 1.5 %
ERYTHROCYTE [DISTWIDTH] IN BLOOD BY AUTOMATED COUNT: 12.9 %
EST. AVERAGE GLUCOSE BLD GHB EST-MCNC: 131 MG/DL (ref 68–126)
FASTING PATIENT LIPID ANSWER: YES
FASTING STATUS PATIENT QL REPORTED: YES
GLOBULIN PLAS-MCNC: 3.5 G/DL (ref 2.8–4.4)
GLUCOSE BLD-MCNC: 91 MG/DL (ref 70–99)
HBA1C MFR BLD: 6.2 % (ref ?–5.7)
HCT VFR BLD AUTO: 39 %
HDLC SERPL-MCNC: 40 MG/DL (ref 40–59)
HGB BLD-MCNC: 12.8 G/DL
IMM GRANULOCYTES # BLD AUTO: 0 X10(3) UL (ref 0–1)
IMM GRANULOCYTES NFR BLD: 0 %
LDLC SERPL CALC-MCNC: 120 MG/DL (ref ?–100)
LYMPHOCYTES # BLD AUTO: 1.88 X10(3) UL (ref 1–4)
LYMPHOCYTES NFR BLD AUTO: 47.4 %
MCH RBC QN AUTO: 33.2 PG (ref 26–34)
MCHC RBC AUTO-ENTMCNC: 32.8 G/DL (ref 31–37)
MCV RBC AUTO: 101 FL
MONOCYTES # BLD AUTO: 0.34 X10(3) UL (ref 0.1–1)
MONOCYTES NFR BLD AUTO: 8.6 %
NEUTROPHILS # BLD AUTO: 1.66 X10 (3) UL (ref 1.5–7.7)
NEUTROPHILS # BLD AUTO: 1.66 X10(3) UL (ref 1.5–7.7)
NEUTROPHILS NFR BLD AUTO: 41.7 %
NONHDLC SERPL-MCNC: 135 MG/DL (ref ?–130)
OSMOLALITY SERPL CALC.SUM OF ELEC: 284 MOSM/KG (ref 275–295)
PLATELET # BLD AUTO: 245 10(3)UL (ref 150–450)
POTASSIUM SERPL-SCNC: 3.7 MMOL/L (ref 3.5–5.1)
PROT SERPL-MCNC: 7.1 G/DL (ref 6.4–8.2)
RBC # BLD AUTO: 3.86 X10(6)UL
SODIUM SERPL-SCNC: 138 MMOL/L (ref 136–145)
T4 FREE SERPL-MCNC: 1 NG/DL (ref 0.8–1.7)
TRIGL SERPL-MCNC: 79 MG/DL (ref 30–149)
TSI SER-ACNC: 1.04 MIU/ML (ref 0.36–3.74)
VIT B12 SERPL-MCNC: 713 PG/ML (ref 193–986)
VLDLC SERPL CALC-MCNC: 14 MG/DL (ref 0–30)
WBC # BLD AUTO: 4 X10(3) UL (ref 4–11)

## 2022-06-27 PROCEDURE — 84439 ASSAY OF FREE THYROXINE: CPT

## 2022-06-27 PROCEDURE — 84443 ASSAY THYROID STIM HORMONE: CPT

## 2022-06-27 PROCEDURE — 83036 HEMOGLOBIN GLYCOSYLATED A1C: CPT

## 2022-06-27 PROCEDURE — 82607 VITAMIN B-12: CPT

## 2022-06-27 PROCEDURE — 85025 COMPLETE CBC W/AUTO DIFF WBC: CPT

## 2022-06-27 PROCEDURE — 80053 COMPREHEN METABOLIC PANEL: CPT

## 2022-06-27 PROCEDURE — 36415 COLL VENOUS BLD VENIPUNCTURE: CPT

## 2022-06-27 PROCEDURE — 80061 LIPID PANEL: CPT

## 2022-07-01 ENCOUNTER — OFFICE VISIT (OUTPATIENT)
Dept: FAMILY MEDICINE CLINIC | Facility: CLINIC | Age: 47
End: 2022-07-01
Payer: COMMERCIAL

## 2022-07-01 VITALS
DIASTOLIC BLOOD PRESSURE: 60 MMHG | HEART RATE: 93 BPM | BODY MASS INDEX: 30.3 KG/M2 | HEIGHT: 63 IN | OXYGEN SATURATION: 97 % | TEMPERATURE: 98 F | WEIGHT: 171 LBS | RESPIRATION RATE: 18 BRPM | SYSTOLIC BLOOD PRESSURE: 122 MMHG

## 2022-07-01 DIAGNOSIS — E89.0 POSTSURGICAL HYPOTHYROIDISM: Primary | ICD-10-CM

## 2022-07-01 DIAGNOSIS — R73.03 PREDIABETES: ICD-10-CM

## 2022-07-01 DIAGNOSIS — M94.0 COSTOCHONDRITIS: ICD-10-CM

## 2022-07-01 DIAGNOSIS — N63.21 MASS OF UPPER OUTER QUADRANT OF LEFT BREAST: ICD-10-CM

## 2022-07-01 DIAGNOSIS — E55.9 VITAMIN D DEFICIENCY: ICD-10-CM

## 2022-07-01 DIAGNOSIS — Z51.81 ENCOUNTER FOR MEDICATION MONITORING: ICD-10-CM

## 2022-07-01 DIAGNOSIS — D75.89 MACROCYTOSIS WITHOUT ANEMIA: ICD-10-CM

## 2022-07-01 DIAGNOSIS — E89.0 POSTSURGICAL HYPOTHYROIDISM: ICD-10-CM

## 2022-07-01 DIAGNOSIS — E78.5 DYSLIPIDEMIA: ICD-10-CM

## 2022-07-01 DIAGNOSIS — G43.009 MIGRAINE WITHOUT AURA AND WITHOUT STATUS MIGRAINOSUS, NOT INTRACTABLE: ICD-10-CM

## 2022-07-01 DIAGNOSIS — D72.819 LEUKOPENIA, UNSPECIFIED TYPE: ICD-10-CM

## 2022-07-01 PROCEDURE — 3074F SYST BP LT 130 MM HG: CPT | Performed by: FAMILY MEDICINE

## 2022-07-01 PROCEDURE — 99214 OFFICE O/P EST MOD 30 MIN: CPT | Performed by: FAMILY MEDICINE

## 2022-07-01 PROCEDURE — 3008F BODY MASS INDEX DOCD: CPT | Performed by: FAMILY MEDICINE

## 2022-07-01 PROCEDURE — 3078F DIAST BP <80 MM HG: CPT | Performed by: FAMILY MEDICINE

## 2022-07-01 RX ORDER — LEVOTHYROXINE SODIUM 88 UG/1
88 TABLET ORAL
Qty: 90 TABLET | Refills: 0 | Status: SHIPPED | OUTPATIENT
Start: 2022-07-01

## 2022-07-01 NOTE — TELEPHONE ENCOUNTER
Thyroid Supplements Protocol Passed 07/01/2022 02:33 PM   Protocol Details  TSH test in past 12 months    TSH value between 0.350 and 5.500 IU/ml    Appointment in past 12 or next 3 months     LOV 7/1/22     LAST LAB 6/27/22     LAST RX 5/25/22 30 with 1     Next OV   Future Appointments   Date Time Provider Andreia Sanford   8/16/2022  5:00 PM Betty Peralta MD [facility]         PROTOCOL pass

## 2022-07-10 ENCOUNTER — TELEPHONE (OUTPATIENT)
Dept: FAMILY MEDICINE CLINIC | Facility: CLINIC | Age: 47
End: 2022-07-10

## 2022-07-10 NOTE — TELEPHONE ENCOUNTER
Late entry:  Paged by pt earlier stating that she had a normal period on 6/22 that lasted 5d, which is usual for her. However, she started having vaginal bleeding again that began yesterday. Has been heavy with cramping. This is the first time she has had bleeding after a period. Usual cycle q28d. Denies any changes in diet, exercise or meds recently. Reviewed chart and noted pt has uterine fibroid and recent thyroid labs wnl. Advised pt to take ibuprofen prn, which will help with bleeding and cramps. Pt instructed to f/u with PCP in the next 1-2wks for further eval.  She understands and agrees with tx plan.

## 2022-07-11 ENCOUNTER — TELEPHONE (OUTPATIENT)
Dept: FAMILY MEDICINE CLINIC | Facility: CLINIC | Age: 47
End: 2022-07-11

## 2022-07-11 NOTE — TELEPHONE ENCOUNTER
Pt spoke with Dr. Saeed Greenfield  About break through blleding. Pt is now having headaches and cramping. Not on birth control. First time happening.  Please advise

## 2022-07-19 ENCOUNTER — OFFICE VISIT (OUTPATIENT)
Dept: FAMILY MEDICINE CLINIC | Facility: CLINIC | Age: 47
End: 2022-07-19
Payer: COMMERCIAL

## 2022-07-19 VITALS
RESPIRATION RATE: 16 BRPM | WEIGHT: 173.13 LBS | BODY MASS INDEX: 30.68 KG/M2 | DIASTOLIC BLOOD PRESSURE: 62 MMHG | TEMPERATURE: 97 F | OXYGEN SATURATION: 98 % | HEART RATE: 78 BPM | HEIGHT: 63 IN | SYSTOLIC BLOOD PRESSURE: 108 MMHG

## 2022-07-19 DIAGNOSIS — N92.6 IRREGULAR MENSTRUAL BLEEDING: Primary | ICD-10-CM

## 2022-07-19 DIAGNOSIS — E89.0 POSTSURGICAL HYPOTHYROIDISM: ICD-10-CM

## 2022-07-19 DIAGNOSIS — G43.009 MIGRAINE WITHOUT AURA AND WITHOUT STATUS MIGRAINOSUS, NOT INTRACTABLE: ICD-10-CM

## 2022-07-19 DIAGNOSIS — D25.1 INTRAMURAL LEIOMYOMA OF UTERUS: ICD-10-CM

## 2022-07-19 PROCEDURE — 3074F SYST BP LT 130 MM HG: CPT | Performed by: FAMILY MEDICINE

## 2022-07-19 PROCEDURE — 3008F BODY MASS INDEX DOCD: CPT | Performed by: FAMILY MEDICINE

## 2022-07-19 PROCEDURE — 3078F DIAST BP <80 MM HG: CPT | Performed by: FAMILY MEDICINE

## 2022-07-19 PROCEDURE — 99213 OFFICE O/P EST LOW 20 MIN: CPT | Performed by: FAMILY MEDICINE

## 2022-07-19 RX ORDER — BUTALBITAL, ACETAMINOPHEN AND CAFFEINE 50; 325; 40 MG/1; MG/1; MG/1
1 TABLET ORAL EVERY 6 HOURS PRN
COMMUNITY

## 2022-07-21 ENCOUNTER — HOSPITAL ENCOUNTER (OUTPATIENT)
Dept: MAMMOGRAPHY | Facility: HOSPITAL | Age: 47
Discharge: HOME | End: 2022-07-21
Attending: FAMILY MEDICINE
Payer: COMMERCIAL

## 2022-07-21 DIAGNOSIS — N63.21 MASS OF UPPER OUTER QUADRANT OF LEFT BREAST: ICD-10-CM

## 2022-07-21 PROCEDURE — 77062 BREAST TOMOSYNTHESIS BI: CPT | Performed by: FAMILY MEDICINE

## 2022-07-21 PROCEDURE — 76642 ULTRASOUND BREAST LIMITED: CPT | Performed by: FAMILY MEDICINE

## 2022-07-21 PROCEDURE — 77066 DX MAMMO INCL CAD BI: CPT | Performed by: FAMILY MEDICINE

## 2022-08-16 ENCOUNTER — OFFICE VISIT (OUTPATIENT)
Dept: FAMILY MEDICINE CLINIC | Facility: CLINIC | Age: 47
End: 2022-08-16

## 2022-08-16 VITALS
TEMPERATURE: 97 F | BODY MASS INDEX: 30.65 KG/M2 | DIASTOLIC BLOOD PRESSURE: 66 MMHG | HEART RATE: 88 BPM | SYSTOLIC BLOOD PRESSURE: 114 MMHG | WEIGHT: 173 LBS | HEIGHT: 63 IN

## 2022-08-16 DIAGNOSIS — Z01.419 WELL WOMAN EXAM WITH ROUTINE GYNECOLOGICAL EXAM: Primary | ICD-10-CM

## 2022-08-16 DIAGNOSIS — G43.009 MIGRAINE WITHOUT AURA AND WITHOUT STATUS MIGRAINOSUS, NOT INTRACTABLE: ICD-10-CM

## 2022-08-16 DIAGNOSIS — Z51.81 ENCOUNTER FOR MEDICATION MONITORING: ICD-10-CM

## 2022-08-16 DIAGNOSIS — Z83.2 FAMILY HISTORY OF CLOTTING DISORDER: ICD-10-CM

## 2022-08-16 DIAGNOSIS — Z12.11 ENCOUNTER FOR SCREENING COLONOSCOPY: ICD-10-CM

## 2022-08-16 DIAGNOSIS — M94.0 COSTOCHONDRITIS: ICD-10-CM

## 2022-08-16 DIAGNOSIS — E78.5 DYSLIPIDEMIA: ICD-10-CM

## 2022-08-16 DIAGNOSIS — D72.819 LEUKOPENIA, UNSPECIFIED TYPE: ICD-10-CM

## 2022-08-16 DIAGNOSIS — R73.03 PREDIABETES: ICD-10-CM

## 2022-08-16 DIAGNOSIS — E89.0 POSTSURGICAL HYPOTHYROIDISM: ICD-10-CM

## 2022-08-16 PROCEDURE — 99396 PREV VISIT EST AGE 40-64: CPT | Performed by: FAMILY MEDICINE

## 2022-08-16 PROCEDURE — 3074F SYST BP LT 130 MM HG: CPT | Performed by: FAMILY MEDICINE

## 2022-08-16 PROCEDURE — 3008F BODY MASS INDEX DOCD: CPT | Performed by: FAMILY MEDICINE

## 2022-08-16 PROCEDURE — 87624 HPV HI-RISK TYP POOLED RSLT: CPT | Performed by: FAMILY MEDICINE

## 2022-08-16 PROCEDURE — 3078F DIAST BP <80 MM HG: CPT | Performed by: FAMILY MEDICINE

## 2022-08-17 LAB — HPV I/H RISK 1 DNA SPEC QL NAA+PROBE: NEGATIVE

## 2022-09-22 DIAGNOSIS — E89.0 POSTSURGICAL HYPOTHYROIDISM: ICD-10-CM

## 2022-09-22 RX ORDER — BUTALBITAL, ACETAMINOPHEN AND CAFFEINE 50; 325; 40 MG/1; MG/1; MG/1
1 TABLET ORAL EVERY 6 HOURS PRN
Qty: 30 TABLET | Refills: 1 | Status: SHIPPED | OUTPATIENT
Start: 2022-09-22

## 2022-09-22 RX ORDER — LEVOTHYROXINE SODIUM 88 UG/1
88 TABLET ORAL
Qty: 90 TABLET | Refills: 0 | Status: SHIPPED | OUTPATIENT
Start: 2022-09-22

## 2022-09-22 NOTE — TELEPHONE ENCOUNTER
PROTOCOL PASS    No future appointments.     LOV 8/16/2022  levothyroxine 88 MCG Oral Tab 90 tablet 0 7/1/2022

## 2022-12-21 DIAGNOSIS — E89.0 POSTSURGICAL HYPOTHYROIDISM: ICD-10-CM

## 2022-12-22 RX ORDER — LEVOTHYROXINE SODIUM 88 UG/1
TABLET ORAL
Qty: 90 TABLET | Refills: 0 | Status: SHIPPED | OUTPATIENT
Start: 2022-12-22

## 2023-01-12 ENCOUNTER — TELEPHONE (OUTPATIENT)
Dept: HEMATOLOGY/ONCOLOGY | Facility: HOSPITAL | Age: 48
End: 2023-01-12

## 2023-01-18 ENCOUNTER — TELEPHONE (OUTPATIENT)
Dept: HEMATOLOGY/ONCOLOGY | Facility: HOSPITAL | Age: 48
End: 2023-01-18

## 2023-01-18 NOTE — TELEPHONE ENCOUNTER
Patient Is calling to schedule new consult appointment referred by Dr. Suze SANTIAGO diagnosis: Family history of clotting disorder.  Call back number is [phone]
Stable

## 2023-01-31 ENCOUNTER — OFFICE VISIT (OUTPATIENT)
Dept: HEMATOLOGY/ONCOLOGY | Facility: HOSPITAL | Age: 48
End: 2023-01-31
Attending: INTERNAL MEDICINE
Payer: COMMERCIAL

## 2023-01-31 VITALS
RESPIRATION RATE: 18 BRPM | WEIGHT: 175.38 LBS | SYSTOLIC BLOOD PRESSURE: 108 MMHG | HEART RATE: 118 BPM | OXYGEN SATURATION: 96 % | TEMPERATURE: 97 F | BODY MASS INDEX: 31 KG/M2 | DIASTOLIC BLOOD PRESSURE: 69 MMHG

## 2023-01-31 DIAGNOSIS — D75.89 MACROCYTOSIS: ICD-10-CM

## 2023-01-31 DIAGNOSIS — Z82.49 FAMILY HISTORY OF BLOOD CLOTS: Primary | ICD-10-CM

## 2023-01-31 PROCEDURE — 99245 OFF/OP CONSLTJ NEW/EST HI 55: CPT | Performed by: INTERNAL MEDICINE

## 2023-01-31 NOTE — PROGRESS NOTES
Education Record    Learner:  Patient    Disease / Diagnosis:clotting disorder    Barriers / Limitations:  None   Comments:    Method:  Discussion   Comments:    General Topics:  Plan of care reviewed   Comments:    Outcome:  Shows understanding   Comments:    Patient here as a new consult. States her three sisters have blood clots and are on lifelong anticoagulation. States her daughter has had a blood clot as well. Denies any known previous DVT. Not having any chest pain or dyspnea. Not having any redness/warmth/swelling to any extremities.

## 2023-02-03 ENCOUNTER — LAB ENCOUNTER (OUTPATIENT)
Dept: LAB | Age: 48
End: 2023-02-03
Attending: FAMILY MEDICINE
Payer: COMMERCIAL

## 2023-02-03 DIAGNOSIS — E78.5 DYSLIPIDEMIA: ICD-10-CM

## 2023-02-03 DIAGNOSIS — R73.03 PREDIABETES: ICD-10-CM

## 2023-02-03 DIAGNOSIS — D75.89 MACROCYTOSIS: ICD-10-CM

## 2023-02-03 DIAGNOSIS — E89.0 POSTSURGICAL HYPOTHYROIDISM: ICD-10-CM

## 2023-02-03 DIAGNOSIS — D72.819 LEUKOPENIA, UNSPECIFIED TYPE: ICD-10-CM

## 2023-02-03 DIAGNOSIS — Z82.49 FAMILY HISTORY OF BLOOD CLOTS: ICD-10-CM

## 2023-02-03 DIAGNOSIS — Z51.81 ENCOUNTER FOR MEDICATION MONITORING: ICD-10-CM

## 2023-02-03 LAB
ALBUMIN SERPL-MCNC: 4 G/DL (ref 3.4–5)
ALBUMIN/GLOB SERPL: 1.1 {RATIO} (ref 1–2)
ALP LIVER SERPL-CCNC: 59 U/L
ALT SERPL-CCNC: 19 U/L
ANION GAP SERPL CALC-SCNC: 5 MMOL/L (ref 0–18)
AST SERPL-CCNC: 11 U/L (ref 15–37)
BASOPHILS # BLD AUTO: 0.02 X10(3) UL (ref 0–0.2)
BASOPHILS NFR BLD AUTO: 0.7 %
BILIRUB SERPL-MCNC: 0.6 MG/DL (ref 0.1–2)
BUN BLD-MCNC: 11 MG/DL (ref 7–18)
CALCIUM BLD-MCNC: 9 MG/DL (ref 8.5–10.1)
CHLORIDE SERPL-SCNC: 107 MMOL/L (ref 98–112)
CHOLEST SERPL-MCNC: 192 MG/DL (ref ?–200)
CO2 SERPL-SCNC: 29 MMOL/L (ref 21–32)
CREAT BLD-MCNC: 0.97 MG/DL
EOSINOPHIL # BLD AUTO: 0.05 X10(3) UL (ref 0–0.7)
EOSINOPHIL NFR BLD AUTO: 1.7 %
ERYTHROCYTE [DISTWIDTH] IN BLOOD BY AUTOMATED COUNT: 13 %
EST. AVERAGE GLUCOSE BLD GHB EST-MCNC: 128 MG/DL (ref 68–126)
FASTING PATIENT LIPID ANSWER: YES
FASTING STATUS PATIENT QL REPORTED: YES
GFR SERPLBLD BASED ON 1.73 SQ M-ARVRAT: 72 ML/MIN/1.73M2 (ref 60–?)
GLOBULIN PLAS-MCNC: 3.8 G/DL (ref 2.8–4.4)
GLUCOSE BLD-MCNC: 97 MG/DL (ref 70–99)
HBA1C MFR BLD: 6.1 % (ref ?–5.7)
HCT VFR BLD AUTO: 41.5 %
HDLC SERPL-MCNC: 41 MG/DL (ref 40–59)
HGB BLD-MCNC: 13.7 G/DL
HGB RETIC QN AUTO: 40 PG (ref 28.2–36.6)
IMM GRANULOCYTES # BLD AUTO: 0.01 X10(3) UL (ref 0–1)
IMM GRANULOCYTES NFR BLD: 0.3 %
IMM RETICS NFR: 0.13 RATIO (ref 0.1–0.3)
LDLC SERPL CALC-MCNC: 137 MG/DL (ref ?–100)
LYMPHOCYTES # BLD AUTO: 1.24 X10(3) UL (ref 1–4)
LYMPHOCYTES NFR BLD AUTO: 41.9 %
MCH RBC QN AUTO: 33.7 PG (ref 26–34)
MCHC RBC AUTO-ENTMCNC: 33 G/DL (ref 31–37)
MCV RBC AUTO: 102 FL
MONOCYTES # BLD AUTO: 0.21 X10(3) UL (ref 0.1–1)
MONOCYTES NFR BLD AUTO: 7.1 %
NEUTROPHILS # BLD AUTO: 1.43 X10 (3) UL (ref 1.5–7.7)
NEUTROPHILS # BLD AUTO: 1.43 X10(3) UL (ref 1.5–7.7)
NEUTROPHILS NFR BLD AUTO: 48.3 %
NONHDLC SERPL-MCNC: 151 MG/DL (ref ?–130)
OSMOLALITY SERPL CALC.SUM OF ELEC: 291 MOSM/KG (ref 275–295)
PLATELET # BLD AUTO: 235 10(3)UL (ref 150–450)
POTASSIUM SERPL-SCNC: 3.5 MMOL/L (ref 3.5–5.1)
PROT SERPL-MCNC: 7.8 G/DL (ref 6.4–8.2)
RBC # BLD AUTO: 4.07 X10(6)UL
RETICS # AUTO: 75.7 X10(3) UL (ref 22.5–147.5)
RETICS/RBC NFR AUTO: 1.9 %
SODIUM SERPL-SCNC: 141 MMOL/L (ref 136–145)
T4 FREE SERPL-MCNC: 1.1 NG/DL (ref 0.8–1.7)
TRIGL SERPL-MCNC: 77 MG/DL (ref 30–149)
TSI SER-ACNC: 1.19 MIU/ML (ref 0.36–3.74)
VLDLC SERPL CALC-MCNC: 14 MG/DL (ref 0–30)
WBC # BLD AUTO: 3 X10(3) UL (ref 4–11)

## 2023-02-03 PROCEDURE — 82525 ASSAY OF COPPER: CPT

## 2023-02-03 PROCEDURE — 80053 COMPREHEN METABOLIC PANEL: CPT

## 2023-02-03 PROCEDURE — 83036 HEMOGLOBIN GLYCOSYLATED A1C: CPT

## 2023-02-03 PROCEDURE — 36415 COLL VENOUS BLD VENIPUNCTURE: CPT

## 2023-02-03 PROCEDURE — 84443 ASSAY THYROID STIM HORMONE: CPT

## 2023-02-03 PROCEDURE — 85025 COMPLETE CBC W/AUTO DIFF WBC: CPT

## 2023-02-03 PROCEDURE — 85045 AUTOMATED RETICULOCYTE COUNT: CPT

## 2023-02-03 PROCEDURE — 80061 LIPID PANEL: CPT

## 2023-02-03 PROCEDURE — 84439 ASSAY OF FREE THYROXINE: CPT

## 2023-02-05 LAB — COPPER, SERUM: 103.1 UG/DL

## 2023-02-07 ENCOUNTER — TELEPHONE (OUTPATIENT)
Dept: FAMILY MEDICINE CLINIC | Facility: CLINIC | Age: 48
End: 2023-02-07

## 2023-02-07 NOTE — TELEPHONE ENCOUNTER
Please advise on lab results. Component      Latest Ref Rng & Units 2/3/2023   WBC      4.0 - 11.0 x10(3) uL 3.0 (L)   RBC      3.80 - 5.30 x10(6)uL 4.07   Hemoglobin      12.0 - 16.0 g/dL 13.7   Hematocrit      35.0 - 48.0 % 41.5   Platelet Count      905.0 - 450.0 10(3)uL 235.0   MCV      80.0 - 100.0 fL 102.0 (H)   MCH      26.0 - 34.0 pg 33.7   MCHC      31.0 - 37.0 g/dL 33.0   RDW      % 13.0   Prelim Neutrophil Abs      1.50 - 7.70 x10 (3) uL 1.43 (L)   Neutrophils Absolute      1.50 - 7.70 x10(3) uL 1.43 (L)   Lymphocytes Absolute      1.00 - 4.00 x10(3) uL 1.24   Monocytes Absolute      0.10 - 1.00 x10(3) uL 0.21   Eosinophils Absolute      0.00 - 0.70 x10(3) uL 0.05   Basophils Absolute      0.00 - 0.20 x10(3) uL 0.02   Immature Granulocyte Absolute      0.00 - 1.00 x10(3) uL 0.01   Neutrophils %      % 48.3   Lymphocytes %      % 41.9   Monocytes %      % 7.1   Eosinophils %      % 1.7   Basophils %      % 0.7   Immature Granulocyte %      % 0.3   Glucose      70 - 99 mg/dL 97   Sodium      136 - 145 mmol/L 141   Potassium      3.5 - 5.1 mmol/L 3.5   Chloride      98 - 112 mmol/L 107   Carbon Dioxide, Total      21.0 - 32.0 mmol/L 29.0   ANION GAP      0 - 18 mmol/L 5   BUN      7 - 18 mg/dL 11   CREATININE      0.55 - 1.02 mg/dL 0.97   CALCIUM      8.5 - 10.1 mg/dL 9.0   CALCULATED OSMOLALITY      275 - 295 mOsm/kg 291   eGFR-Cr      >=60 mL/min/1.73m2 72   AST (SGOT)      15 - 37 U/L 11 (L)   ALT (SGPT)      13 - 56 U/L 19   ALKALINE PHOSPHATASE      39 - 100 U/L 59   Total Bilirubin      0.1 - 2.0 mg/dL 0.6   PROTEIN, TOTAL      6.4 - 8.2 g/dL 7.8   Albumin      3.4 - 5.0 g/dL 4.0   Globulin      2.8 - 4.4 g/dL 3.8   A/G Ratio      1.0 - 2.0 1.1   Patient Fasting for CMP?        Yes   Cholesterol, Total      <200 mg/dL 192   HDL Cholesterol      40 - 59 mg/dL 41   Triglycerides      30 - 149 mg/dL 77   LDL Cholesterol Calc      <100 mg/dL 137 (H)   VLDL      0 - 30 mg/dL 14   NON-HDL CHOLESTEROL <130 mg/dL 151 (H)   Patient Fasting for Lipid?        Yes   RETIC%      0.5 - 2.5 % 1.9   RETIC ABSOLUTE      22.5 - 147.5 x10(3) uL 75.7   Retic IRF      0.100 - 0.300 Ratio 0.129   Reticulocyte Hemoglobin Equivalent      28.2 - 36.6 pg 40.0 (H)   HEMOGLOBIN A1c      <5.7 % 6.1 (H)   ESTIMATED AVERAGE GLUCOSE      68 - 126 mg/dL 128 (H)   T4,Free (Direct)      0.8 - 1.7 ng/dL 1.1   TSH      0.358 - 3.740 mIU/mL 1.190   Copper, Serum      80.0 - 155.0 ug/dL 103.1

## 2023-02-09 NOTE — TELEPHONE ENCOUNTER
Wendy Nevarez, SHWETA   2/9/2023 10:44 AM CST Back to Top      Discussed results and recommendations with patient. Patient verbalized understanding.

## 2023-02-15 ENCOUNTER — TELEPHONE (OUTPATIENT)
Dept: FAMILY MEDICINE CLINIC | Facility: CLINIC | Age: 48
End: 2023-02-15

## 2023-02-15 NOTE — TELEPHONE ENCOUNTER
Spoke wit patient. Patient states that she is aware that approval is getting obtained from Dr. Sendy Bee office but it may be necessary that it come from Dr. Al Arana. Assured patient that if approval is needed, Gil CARRANZA will reach out to this office. Patient verbalized understanding.

## 2023-02-15 NOTE — TELEPHONE ENCOUNTER
Pt is looking for information regarding her TIA that happened in April 2015. Pt will be having a colonoscopy and they need follow up information from Dr. Jack Perez on this issue. Also, they will need to know if the above pt will have any issues with anesthesia. Information needs to be sent to [facility]:     VILMA Hicks  Phone number [phone]  Fax: [phone]

## 2023-02-17 ENCOUNTER — TELEPHONE (OUTPATIENT)
Dept: HEMATOLOGY/ONCOLOGY | Facility: HOSPITAL | Age: 48
End: 2023-02-17

## 2023-02-22 ENCOUNTER — TELEPHONE (OUTPATIENT)
Dept: FAMILY MEDICINE CLINIC | Facility: CLINIC | Age: 48
End: 2023-02-22

## 2023-02-28 NOTE — TELEPHONE ENCOUNTER
Left message to call back.   See result notes below Body Location Override (Optional - Billing Will Still Be Based On Selected Body Map Location If Applicable): face Detail Level: Detailed Size Of Lesion In Cm (Optional): 0

## 2023-03-20 DIAGNOSIS — E89.0 POSTSURGICAL HYPOTHYROIDISM: ICD-10-CM

## 2023-03-20 RX ORDER — LEVOTHYROXINE SODIUM 88 UG/1
TABLET ORAL
Qty: 90 TABLET | Refills: 0 | Status: SHIPPED | OUTPATIENT
Start: 2023-03-20

## 2023-03-20 NOTE — TELEPHONE ENCOUNTER
Thyroid Supplements Protocol Passed 03/20/2023 08:43 AM   Protocol Details  TSH test in past 12 months    TSH value between 0.350 and 5.500 IU/ml    Appointment in past 12 or next 3 months        LOV 8/16/22     LAST LAB 2/3/23     LAST RX 12/22/22 90     Next OV   Future Appointments   Date Time Provider Andreia Sanford   3/22/2023  1:00 PM Grisel Durbin MD [facility]         PROTOCOL pass

## 2023-03-20 NOTE — TELEPHONE ENCOUNTER
Pt called about this refill. Said she is out. Wants to make sure Dr Sukhdev Babcock is not adjusting dosage.

## 2023-03-22 ENCOUNTER — OFFICE VISIT (OUTPATIENT)
Dept: FAMILY MEDICINE CLINIC | Facility: CLINIC | Age: 48
End: 2023-03-22
Payer: COMMERCIAL

## 2023-03-22 VITALS
RESPIRATION RATE: 18 BRPM | OXYGEN SATURATION: 99 % | TEMPERATURE: 98 F | BODY MASS INDEX: 31.71 KG/M2 | HEART RATE: 75 BPM | DIASTOLIC BLOOD PRESSURE: 70 MMHG | HEIGHT: 63 IN | SYSTOLIC BLOOD PRESSURE: 114 MMHG | WEIGHT: 179 LBS

## 2023-03-22 DIAGNOSIS — R07.89 OTHER CHEST PAIN: Primary | ICD-10-CM

## 2023-03-22 DIAGNOSIS — E89.0 POSTSURGICAL HYPOTHYROIDISM: ICD-10-CM

## 2023-03-22 DIAGNOSIS — G43.109 COMPLICATED MIGRAINE: ICD-10-CM

## 2023-03-22 DIAGNOSIS — Z01.818 PREOPERATIVE CLEARANCE: ICD-10-CM

## 2023-03-22 DIAGNOSIS — M79.601 RIGHT ARM PAIN: ICD-10-CM

## 2023-03-22 DIAGNOSIS — J30.1 SEASONAL ALLERGIC RHINITIS DUE TO POLLEN: ICD-10-CM

## 2023-03-22 DIAGNOSIS — Z12.11 ENCOUNTER FOR SCREENING COLONOSCOPY: ICD-10-CM

## 2023-03-22 DIAGNOSIS — G43.009 MIGRAINE WITHOUT AURA AND WITHOUT STATUS MIGRAINOSUS, NOT INTRACTABLE: ICD-10-CM

## 2023-03-22 LAB
ATRIAL RATE: 64 BPM
P AXIS: 56 DEGREES
P-R INTERVAL: 168 MS
Q-T INTERVAL: 406 MS
QRS DURATION: 88 MS
QTC CALCULATION (BEZET): 418 MS
R AXIS: 7 DEGREES
T AXIS: 11 DEGREES
VENTRICULAR RATE: 64 BPM

## 2023-03-22 PROCEDURE — 3008F BODY MASS INDEX DOCD: CPT | Performed by: FAMILY MEDICINE

## 2023-03-22 PROCEDURE — 93000 ELECTROCARDIOGRAM COMPLETE: CPT | Performed by: FAMILY MEDICINE

## 2023-03-22 PROCEDURE — 3078F DIAST BP <80 MM HG: CPT | Performed by: FAMILY MEDICINE

## 2023-03-22 PROCEDURE — 99214 OFFICE O/P EST MOD 30 MIN: CPT | Performed by: FAMILY MEDICINE

## 2023-03-22 PROCEDURE — 3074F SYST BP LT 130 MM HG: CPT | Performed by: FAMILY MEDICINE

## 2023-03-22 RX ORDER — TIZANIDINE 4 MG/1
TABLET ORAL
Qty: 20 TABLET | Refills: 0 | Status: SHIPPED | OUTPATIENT
Start: 2023-03-22 | End: 2023-03-27

## 2023-03-27 ENCOUNTER — MED REC SCAN ONLY (OUTPATIENT)
Dept: FAMILY MEDICINE CLINIC | Facility: CLINIC | Age: 48
End: 2023-03-27

## 2023-03-27 ENCOUNTER — VIRTUAL PHONE E/M (OUTPATIENT)
Dept: FAMILY MEDICINE CLINIC | Facility: CLINIC | Age: 48
End: 2023-03-27
Payer: COMMERCIAL

## 2023-03-27 DIAGNOSIS — G43.109 COMPLICATED MIGRAINE: Primary | ICD-10-CM

## 2023-03-27 DIAGNOSIS — R07.89 MUSCULOSKELETAL CHEST PAIN: ICD-10-CM

## 2023-03-27 DIAGNOSIS — R52 PAIN OF RIGHT SIDE OF BODY: ICD-10-CM

## 2023-03-27 DIAGNOSIS — R25.3 EYE MUSCLE TWITCHES: ICD-10-CM

## 2023-03-27 DIAGNOSIS — R29.818 TRANSIENT NEUROLOGICAL SYMPTOMS: ICD-10-CM

## 2023-04-03 ENCOUNTER — TELEPHONE (OUTPATIENT)
Dept: FAMILY MEDICINE CLINIC | Facility: CLINIC | Age: 48
End: 2023-04-03

## 2023-04-03 NOTE — TELEPHONE ENCOUNTER
[facility] requesting return call with symptom detail.     Dr. Bernabe Collins,  [facility]

## 2023-04-03 NOTE — TELEPHONE ENCOUNTER
Patient returned call and states she wanted to give Dr. Mariana Hendricks an update of what's going on with the pain in her extremities. She is not having pain in the right arm or leg as she had before. Friday she developed a painful cramping in her left thigh. States it's making it hard for her to move properly. Denies any calf pain, weakness, N/T or redness. States she thinks that leg might be very slightly larger than the right, but she did have an old injury on that side. Offered to look for open appt. States she just wanted to update Dr. Mariana Hendricks and will schedule appt if she wants to see her. Dr. Mariana Hendricks,  Please advise    LOV Virtual Phone  Complicated Migraine   +4    Did not take Tizanidine as RUE sxs resolved without need for med. RUE and RLE are fine now. No pain today. 3. Pain of right side of body  Clinically symptoms related to complicated migraine. Butalbital as needed helping, but patient developed some right sided pain, first in the upper extremity, then in the lower extremity with recent migraine. Symptoms resolved in a few hours and migraine also had essentially resolved and was gone by the next day. Menstrual cycles tend to be a trigger for her migraines. Advised to keep log of symptoms over the next couple of months. Should also follow-up neurologist to see if any further work-up needed. Had unremarkable noncontrast brain MRI in May 2022. Avoid possible dietary triggers for migraines.   Good sleep hygiene.  - NEURO - INTERNAL (has not scheduled yet)

## 2023-04-03 NOTE — TELEPHONE ENCOUNTER
Pt asking to sp w/  said it is related to symptoms she was having and discussed with Dr on 3/27/23, now having cramps in left leg that are painful and making it difficult to walk

## 2023-04-03 NOTE — TELEPHONE ENCOUNTER
Please notify pt to take tizanidine ( muscle relaxant) as recently prescribed. This should help with her left thigh cramping. Can also take Tylenol or ibuprofen as needed for pain. If symptoms are not better, she should schedule an appointment for evaluation as this is a new symptom. Would also recommend her to schedule an appointment with neurology as soon as possible as referred.

## 2023-04-27 ENCOUNTER — HOSPITAL ENCOUNTER (EMERGENCY)
Facility: HOSPITAL | Age: 48
Discharge: HOME | End: 2023-04-28
Attending: EMERGENCY MEDICINE
Payer: COMMERCIAL

## 2023-04-27 DIAGNOSIS — R09.1 PLEURISY: Primary | ICD-10-CM

## 2023-04-27 PROCEDURE — 93010 ELECTROCARDIOGRAM REPORT: CPT

## 2023-04-27 PROCEDURE — 36415 COLL VENOUS BLD VENIPUNCTURE: CPT

## 2023-04-27 PROCEDURE — 93005 ELECTROCARDIOGRAM TRACING: CPT

## 2023-04-27 PROCEDURE — 99284 EMERGENCY DEPT VISIT MOD MDM: CPT

## 2023-04-27 PROCEDURE — 99285 EMERGENCY DEPT VISIT HI MDM: CPT

## 2023-04-28 ENCOUNTER — TELEPHONE (OUTPATIENT)
Dept: INTERNAL MEDICINE CLINIC | Facility: CLINIC | Age: 48
End: 2023-04-28

## 2023-04-28 ENCOUNTER — APPOINTMENT (OUTPATIENT)
Dept: GENERAL RADIOLOGY | Facility: HOSPITAL | Age: 48
End: 2023-04-28
Attending: EMERGENCY MEDICINE
Payer: COMMERCIAL

## 2023-04-28 ENCOUNTER — PATIENT OUTREACH (OUTPATIENT)
Dept: CASE MANAGEMENT | Age: 48
End: 2023-04-28

## 2023-04-28 VITALS
BODY MASS INDEX: 31.18 KG/M2 | DIASTOLIC BLOOD PRESSURE: 74 MMHG | HEIGHT: 63 IN | TEMPERATURE: 97 F | HEART RATE: 86 BPM | RESPIRATION RATE: 22 BRPM | WEIGHT: 176 LBS | OXYGEN SATURATION: 97 % | SYSTOLIC BLOOD PRESSURE: 119 MMHG

## 2023-04-28 LAB
ALBUMIN SERPL-MCNC: 3.6 G/DL (ref 3.4–5)
ALBUMIN/GLOB SERPL: 1 {RATIO} (ref 1–2)
ALP LIVER SERPL-CCNC: 60 U/L
ALT SERPL-CCNC: 18 U/L
ANION GAP SERPL CALC-SCNC: 3 MMOL/L (ref 0–18)
AST SERPL-CCNC: 17 U/L (ref 15–37)
ATRIAL RATE: 72 BPM
ATRIAL RATE: 82 BPM
BASOPHILS # BLD AUTO: 0.02 X10(3) UL (ref 0–0.2)
BASOPHILS NFR BLD AUTO: 0.5 %
BILIRUB SERPL-MCNC: 0.2 MG/DL (ref 0.1–2)
BUN BLD-MCNC: 14 MG/DL (ref 7–18)
CALCIUM BLD-MCNC: 8.8 MG/DL (ref 8.5–10.1)
CHLORIDE SERPL-SCNC: 109 MMOL/L (ref 98–112)
CO2 SERPL-SCNC: 30 MMOL/L (ref 21–32)
CREAT BLD-MCNC: 1.5 MG/DL
D DIMER PPP FEU-MCNC: 0.47 UG/ML FEU (ref ?–0.5)
EOSINOPHIL # BLD AUTO: 0.07 X10(3) UL (ref 0–0.7)
EOSINOPHIL NFR BLD AUTO: 1.7 %
ERYTHROCYTE [DISTWIDTH] IN BLOOD BY AUTOMATED COUNT: 12.9 %
GFR SERPLBLD BASED ON 1.73 SQ M-ARVRAT: 42 ML/MIN/1.73M2 (ref 60–?)
GLOBULIN PLAS-MCNC: 3.5 G/DL (ref 2.8–4.4)
GLUCOSE BLD-MCNC: 108 MG/DL (ref 70–99)
HCT VFR BLD AUTO: 35.2 %
HGB BLD-MCNC: 12.2 G/DL
IMM GRANULOCYTES # BLD AUTO: 0.01 X10(3) UL (ref 0–1)
IMM GRANULOCYTES NFR BLD: 0.2 %
LYMPHOCYTES # BLD AUTO: 1.64 X10(3) UL (ref 1–4)
LYMPHOCYTES NFR BLD AUTO: 39.9 %
MCH RBC QN AUTO: 33.6 PG (ref 26–34)
MCHC RBC AUTO-ENTMCNC: 34.7 G/DL (ref 31–37)
MCV RBC AUTO: 97 FL
MONOCYTES # BLD AUTO: 0.38 X10(3) UL (ref 0.1–1)
MONOCYTES NFR BLD AUTO: 9.2 %
NEUTROPHILS # BLD AUTO: 1.99 X10 (3) UL (ref 1.5–7.7)
NEUTROPHILS # BLD AUTO: 1.99 X10(3) UL (ref 1.5–7.7)
NEUTROPHILS NFR BLD AUTO: 48.5 %
OSMOLALITY SERPL CALC.SUM OF ELEC: 295 MOSM/KG (ref 275–295)
P AXIS: 51 DEGREES
P AXIS: 55 DEGREES
P-R INTERVAL: 158 MS
P-R INTERVAL: 178 MS
PLATELET # BLD AUTO: 211 10(3)UL (ref 150–450)
POTASSIUM SERPL-SCNC: 4 MMOL/L (ref 3.5–5.1)
PROT SERPL-MCNC: 7.1 G/DL (ref 6.4–8.2)
Q-T INTERVAL: 346 MS
Q-T INTERVAL: 368 MS
QRS DURATION: 80 MS
QRS DURATION: 90 MS
QTC CALCULATION (BEZET): 402 MS
QTC CALCULATION (BEZET): 404 MS
R AXIS: 14 DEGREES
R AXIS: 6 DEGREES
RBC # BLD AUTO: 3.63 X10(6)UL
SODIUM SERPL-SCNC: 142 MMOL/L (ref 136–145)
T AXIS: 13 DEGREES
T AXIS: 23 DEGREES
TROPONIN I HIGH SENSITIVITY: <3 NG/L
VENTRICULAR RATE: 72 BPM
VENTRICULAR RATE: 82 BPM
WBC # BLD AUTO: 4.1 X10(3) UL (ref 4–11)

## 2023-04-28 PROCEDURE — 71045 X-RAY EXAM CHEST 1 VIEW: CPT | Performed by: EMERGENCY MEDICINE

## 2023-04-28 PROCEDURE — 93005 ELECTROCARDIOGRAM TRACING: CPT

## 2023-04-28 PROCEDURE — 80053 COMPREHEN METABOLIC PANEL: CPT | Performed by: EMERGENCY MEDICINE

## 2023-04-28 PROCEDURE — 85025 COMPLETE CBC W/AUTO DIFF WBC: CPT | Performed by: EMERGENCY MEDICINE

## 2023-04-28 PROCEDURE — 85379 FIBRIN DEGRADATION QUANT: CPT | Performed by: EMERGENCY MEDICINE

## 2023-04-28 PROCEDURE — 84484 ASSAY OF TROPONIN QUANT: CPT | Performed by: EMERGENCY MEDICINE

## 2023-04-28 NOTE — ED INITIAL ASSESSMENT (HPI)
Pt to ED for c/o mid-sternal pressure with shortness of breath onset 1830. Hx of Migraine Headaches.

## 2023-04-28 NOTE — TELEPHONE ENCOUNTER
Late entry. Patient called at 10:30pm 4/27/23 and was driving, stated she had shortness of breath and mid-sternal chest pain that was severe. Patient instructed to pull over and call 911. Stated she felt ok to  herself to ER.

## 2023-05-02 NOTE — PROGRESS NOTES
3rd attempt EDTE [facility] apt request  No answer, generic vm  Unable to contact pt after multiple attempts  Closing encounter

## 2023-06-23 DIAGNOSIS — E89.0 POSTSURGICAL HYPOTHYROIDISM: ICD-10-CM

## 2023-06-23 RX ORDER — LEVOTHYROXINE SODIUM 88 UG/1
TABLET ORAL
Qty: 90 TABLET | Refills: 0 | Status: SHIPPED | OUTPATIENT
Start: 2023-06-23

## 2023-06-23 NOTE — TELEPHONE ENCOUNTER
LOV 3/22/2023      LAST LAB 2/3/23    LAST RX 3/20/23 90 tab     Next OV   Future Appointments   Date Time Provider Andreia Sanford   8/28/2023  9:15 AM DO TAWNYA Lanza         PROTOCOL pass

## 2023-08-14 ENCOUNTER — TELEPHONE (OUTPATIENT)
Dept: FAMILY MEDICINE CLINIC | Facility: CLINIC | Age: 48
End: 2023-08-14

## 2023-08-14 NOTE — TELEPHONE ENCOUNTER
Please notify patient that she should be evaluated for ongoing GI symptoms for about 2 weeks now. Unfortunately no availabilities in my schedule. Patient can see any available provider in the office or go to urgent care for evaluation. Would also recommend taking over-the-counter probiotic (align or Florastor are good brands) daily, staying on a bland diet and keeping well hydrated.

## 2023-08-14 NOTE — TELEPHONE ENCOUNTER
Spoke with patient and advised on recommendations. Appointment scheduled with Dr. Benjie Ramirez.     Future Appointments   Date Time Provider Andreia Tuckeri   8/21/2023  7:40 AM Sinduh Aburto DO [facility]   8/28/2023  9:15 AM DO TAWNYA Falcon [facility]

## 2023-08-14 NOTE — TELEPHONE ENCOUNTER
Spoke with patient. On August 1st, patient had tacos and baked mostaciolli. Food did not taste right to her. The next day, patient had nausea, abdominal cramps headache, and fatigue. Symptoms lingered for several days. Patient continues to have abdominal cramps when she eats or drinks anything. Decreased appetite due to the cramping. She denies any vomiting or diarrhea. Took pepto with no relief. Cramping does not completely subside but does lessen somewhat after eating or drinking. Patient looking for recommendations. Advised that symptoms have been persisting and she should be evaluated. She is asking for an appointment with Dr. Etienne Barlow only if possible. Advised message will be sent to Dr. Etienne Barlow for recommendations. If any fever, n/v or severe cramping, she should be evaluated in the ER. Routing to Dr. Etienne Barlow.

## 2023-08-28 ENCOUNTER — OFFICE VISIT (OUTPATIENT)
Dept: NEUROLOGY | Facility: CLINIC | Age: 48
End: 2023-08-28
Payer: COMMERCIAL

## 2023-08-28 VITALS
DIASTOLIC BLOOD PRESSURE: 74 MMHG | WEIGHT: 167.81 LBS | BODY MASS INDEX: 30 KG/M2 | RESPIRATION RATE: 16 BRPM | HEART RATE: 88 BPM | SYSTOLIC BLOOD PRESSURE: 122 MMHG

## 2023-08-28 DIAGNOSIS — G43.109 MIGRAINE WITH AURA AND WITHOUT STATUS MIGRAINOSUS, NOT INTRACTABLE: ICD-10-CM

## 2023-08-28 DIAGNOSIS — J30.9 ALLERGIC SINUSITIS: ICD-10-CM

## 2023-08-28 DIAGNOSIS — R51.9 CHRONIC DAILY HEADACHE: Primary | ICD-10-CM

## 2023-08-28 DIAGNOSIS — G43.829 MENSTRUAL MIGRAINE WITHOUT STATUS MIGRAINOSUS, NOT INTRACTABLE: ICD-10-CM

## 2023-08-28 RX ORDER — RIZATRIPTAN BENZOATE 10 MG/1
TABLET, ORALLY DISINTEGRATING ORAL
Qty: 12 TABLET | Refills: 0 | Status: SHIPPED | OUTPATIENT
Start: 2023-08-28

## 2023-09-01 ENCOUNTER — TELEPHONE (OUTPATIENT)
Dept: NEUROLOGY | Facility: CLINIC | Age: 48
End: 2023-09-01

## 2023-09-18 DIAGNOSIS — E89.0 POSTSURGICAL HYPOTHYROIDISM: ICD-10-CM

## 2023-09-19 RX ORDER — LEVOTHYROXINE SODIUM 88 UG/1
TABLET ORAL
Qty: 90 TABLET | Refills: 0 | Status: SHIPPED | OUTPATIENT
Start: 2023-09-19

## 2023-09-19 NOTE — TELEPHONE ENCOUNTER
Thyroid Supplements Protocol [id number]/18/2023 06:27 PM   Protocol Details TSH test in past 12 months    TSH value between 0.350 and 5.500 IU/ml    Appointment in past 12 or next 3 months      LOV 3/27/23     LAST LAB  2/3/23      LAST RX 6/23/23 90     Next OV No future appointments.       PROTOCOL pass

## 2023-10-10 ENCOUNTER — OFFICE VISIT (OUTPATIENT)
Dept: FAMILY MEDICINE CLINIC | Facility: CLINIC | Age: 48
End: 2023-10-10
Payer: COMMERCIAL

## 2023-10-10 VITALS
OXYGEN SATURATION: 97 % | WEIGHT: 175.13 LBS | DIASTOLIC BLOOD PRESSURE: 80 MMHG | SYSTOLIC BLOOD PRESSURE: 122 MMHG | BODY MASS INDEX: 31.03 KG/M2 | HEART RATE: 91 BPM | RESPIRATION RATE: 16 BRPM | HEIGHT: 63 IN | TEMPERATURE: 98 F

## 2023-10-10 DIAGNOSIS — N63.11 MASS OF UPPER OUTER QUADRANT OF RIGHT BREAST: Primary | ICD-10-CM

## 2023-10-10 DIAGNOSIS — G43.009 MIGRAINE WITHOUT AURA AND WITHOUT STATUS MIGRAINOSUS, NOT INTRACTABLE: ICD-10-CM

## 2023-10-10 DIAGNOSIS — L98.9 SKIN LESION OF BACK: ICD-10-CM

## 2023-10-10 PROCEDURE — 88305 TISSUE EXAM BY PATHOLOGIST: CPT | Performed by: FAMILY MEDICINE

## 2023-10-10 PROCEDURE — 3008F BODY MASS INDEX DOCD: CPT | Performed by: FAMILY MEDICINE

## 2023-10-10 PROCEDURE — 3079F DIAST BP 80-89 MM HG: CPT | Performed by: FAMILY MEDICINE

## 2023-10-10 PROCEDURE — 88304 TISSUE EXAM BY PATHOLOGIST: CPT | Performed by: FAMILY MEDICINE

## 2023-10-10 PROCEDURE — 99213 OFFICE O/P EST LOW 20 MIN: CPT | Performed by: FAMILY MEDICINE

## 2023-10-10 PROCEDURE — 3074F SYST BP LT 130 MM HG: CPT | Performed by: FAMILY MEDICINE

## 2023-10-10 PROCEDURE — 11400 EXC TR-EXT B9+MARG 0.5 CM<: CPT | Performed by: FAMILY MEDICINE

## 2023-10-26 ENCOUNTER — HOSPITAL ENCOUNTER (OUTPATIENT)
Dept: MAMMOGRAPHY | Facility: HOSPITAL | Age: 48
Discharge: HOME | End: 2023-10-26
Attending: FAMILY MEDICINE

## 2023-10-26 DIAGNOSIS — N63.11 MASS OF UPPER OUTER QUADRANT OF RIGHT BREAST: ICD-10-CM

## 2023-10-26 PROCEDURE — 77062 BREAST TOMOSYNTHESIS BI: CPT | Performed by: FAMILY MEDICINE

## 2023-10-26 PROCEDURE — 76642 ULTRASOUND BREAST LIMITED: CPT | Performed by: FAMILY MEDICINE

## 2023-10-26 PROCEDURE — 77066 DX MAMMO INCL CAD BI: CPT | Performed by: FAMILY MEDICINE

## 2023-12-15 DIAGNOSIS — E89.0 POSTSURGICAL HYPOTHYROIDISM: ICD-10-CM

## 2023-12-15 RX ORDER — LEVOTHYROXINE SODIUM 88 UG/1
TABLET ORAL
Qty: 90 TABLET | Refills: 0 | Status: SHIPPED | OUTPATIENT
Start: 2023-12-15

## 2023-12-15 NOTE — TELEPHONE ENCOUNTER
LOV 10/10/2023      LAST LAB 2/3/23    LAST RX 9/19/2023 90 tab    Next OV No future appointments.       PROTOCOL pass

## 2024-01-24 ENCOUNTER — TELEPHONE (OUTPATIENT)
Dept: FAMILY MEDICINE CLINIC | Facility: CLINIC | Age: 49
End: 2024-01-24

## 2024-01-24 NOTE — TELEPHONE ENCOUNTER
Patient called stating for the past week she has been experiencing intermittent sharp pain under her left breast.  States not related with activity.  Pain does not radiate anywhere.  Denies chest tightness, SOB, heartburn, indigestion, dizziness or N/V.  States occurs several times per day.  Does not feel like the pain she had previously when she was diagnosed with costochondritis. When asked if she has taken anything for the pain, states she's had some cold sx for the past week and is taking tylenol.  Advised no open appt with Dr. Bautista today.  Does not want to see any other provider.  Scheduled for next available appt on Friday.  Advised if any worsening of pain or occurs more frequently or she develops any other associated symptoms, go to ED for urgent evaluation.  Verbalizes understanding.    Future Appointments   Date Time Provider Department Center   1/26/2024 12:00 PM Ti Bautista MD [facility]

## 2024-01-26 ENCOUNTER — TELEPHONE (OUTPATIENT)
Dept: FAMILY MEDICINE CLINIC | Facility: CLINIC | Age: 49
End: 2024-01-26

## 2024-01-26 ENCOUNTER — OFFICE VISIT (OUTPATIENT)
Dept: FAMILY MEDICINE CLINIC | Facility: CLINIC | Age: 49
End: 2024-01-26
Payer: COMMERCIAL

## 2024-01-26 VITALS
OXYGEN SATURATION: 95 % | WEIGHT: 180 LBS | DIASTOLIC BLOOD PRESSURE: 62 MMHG | HEIGHT: 63 IN | TEMPERATURE: 97 F | SYSTOLIC BLOOD PRESSURE: 110 MMHG | BODY MASS INDEX: 31.89 KG/M2 | RESPIRATION RATE: 18 BRPM | HEART RATE: 95 BPM

## 2024-01-26 DIAGNOSIS — Z51.81 ENCOUNTER FOR MEDICATION MONITORING: ICD-10-CM

## 2024-01-26 DIAGNOSIS — R73.03 PREDIABETES: ICD-10-CM

## 2024-01-26 DIAGNOSIS — G43.009 MIGRAINE WITHOUT AURA AND WITHOUT STATUS MIGRAINOSUS, NOT INTRACTABLE: ICD-10-CM

## 2024-01-26 DIAGNOSIS — R07.89 OTHER CHEST PAIN: Primary | ICD-10-CM

## 2024-01-26 DIAGNOSIS — E55.9 VITAMIN D DEFICIENCY: ICD-10-CM

## 2024-01-26 DIAGNOSIS — E89.0 POSTSURGICAL HYPOTHYROIDISM: ICD-10-CM

## 2024-01-26 DIAGNOSIS — M77.8 TENDINITIS OF LEFT SHOULDER: ICD-10-CM

## 2024-01-26 DIAGNOSIS — E78.5 DYSLIPIDEMIA: ICD-10-CM

## 2024-01-26 LAB
ATRIAL RATE: 79 BPM
P AXIS: 58 DEGREES
P-R INTERVAL: 154 MS
Q-T INTERVAL: 364 MS
QRS DURATION: 78 MS
QTC CALCULATION (BEZET): 417 MS
R AXIS: 11 DEGREES
T AXIS: 10 DEGREES
VENTRICULAR RATE: 79 BPM

## 2024-01-26 PROCEDURE — 3008F BODY MASS INDEX DOCD: CPT | Performed by: FAMILY MEDICINE

## 2024-01-26 PROCEDURE — 99214 OFFICE O/P EST MOD 30 MIN: CPT | Performed by: FAMILY MEDICINE

## 2024-01-26 PROCEDURE — 93000 ELECTROCARDIOGRAM COMPLETE: CPT | Performed by: FAMILY MEDICINE

## 2024-01-26 PROCEDURE — 3074F SYST BP LT 130 MM HG: CPT | Performed by: FAMILY MEDICINE

## 2024-01-26 PROCEDURE — 3078F DIAST BP <80 MM HG: CPT | Performed by: FAMILY MEDICINE

## 2024-01-26 RX ORDER — MELOXICAM 15 MG/1
TABLET ORAL
Qty: 20 TABLET | Refills: 0 | Status: SHIPPED | OUTPATIENT
Start: 2024-01-26

## 2024-01-26 RX ORDER — TIZANIDINE 2 MG/1
TABLET ORAL
Qty: 20 TABLET | Refills: 1 | Status: SHIPPED | OUTPATIENT
Start: 2024-01-26

## 2024-01-26 NOTE — PATIENT INSTRUCTIONS
Stop ibuprofen.    Meloxicam 15 mg daily in a.m. for 1 week, then daily as needed.  Take with food.  (Anti-inflammatory and pain relief)    Tizanidine 2 mg daily at bedtime for 1 week, then daily as needed (muscle relaxant)    Tylenol extra strength, 2 tablets every 6 hours as needed for any breakthrough pain    Warm compress to left shoulder and chest wall twice daily for 2-3 days, then twice daily as needed.

## 2024-01-26 NOTE — PROGRESS NOTES
Td Quintero is a 48 year old female.  HPI:  Pt c/o intermittent CP for a couple of weeks.   CP on L side, lasts for a few secs , then resolves but stops pt from what she is doing.  Had 2 episodes this AM.  Usually happens in L chest under breast and today was L upper chest.  A few times felt a \"Flutter\" in chest in last 2 days  Sxs occur at rest.  No heartburn  No gas  No rash  No radiation of pain   No associated SOB, palpitations or diaphoresis.  Mom with MVP and AAA  Paternal aunt with CAD age < age 60  Just started menses and has migraine cyclically which will last for a few days.   States lives with headaches, has daily dull achy bitemporal headaches that pt is used to.  Yest had a lot of pain in R orbital region, and usually this is not due to migraines.  No dizziness, no vision changes  No focal numbness or tingling.   Taking Ibuprofen 600-800mg and Tylenol ES prn for chest sxs and headaches.   No known injury, no heavy lifting.  Also having some left shoulder pain with difficulty over reaching/back reaching.    Current Outpatient Medications   Medication Sig Dispense Refill    Meloxicam 15 MG Oral Tab 1 p.o. daily for 1 week, then 1 p.o. daily as needed.  Take with food. 20 tablet 0    tiZANidine 2 MG Oral Tab 1 tablet po at bedtime for one week, then 1 po at bedtime/prn 20 tablet 1    LEVOTHYROXINE 88 MCG Oral Tab TAKE 1 TABLET(88 MCG) BY MOUTH BEFORE BREAKFAST 90 tablet 0    rizatriptan 10 MG Oral Tablet Dispersible use at onset; may repeat once after 2 hours- ONLY 2 IN 24 HOUR PERIOD MAX. This is a 30 day supply. 12 tablet 0    butalbital-acetaminophen-caffeine -40 MG Oral Tab Take 1 tablet by mouth every 6 (six) hours as needed for Headaches. 30 tablet 1    Fexofenadine HCl (ALLEGRA ODT OR) Take 180 mg by mouth as needed.                    Past Medical History:   Diagnosis Date    Acute maxillary sinusitis     Acute, but ill-defined, cerebrovascular disease     Allergic rhinitis     Anemia      Costochondritis     Dysphagia, unspecified(787.20)     History of abnormal Pap smear     s/p cryo    Leukocytopenia, unspecified     Lump or mass in breast     Migraine     Migraines     Nontoxic multinodular goiter     s/p thyroid bx  & 10/2008, both benign     Osteoarthrosis, unspecified whether generalized or localized, unspecified site     Right Knee, Spine    Other chest pain     Prediabetes     per pt.     TIA (transient ischemic attack)     Unspecified disorder of thyroid 2014    MNG    Visual impairment     glasses as needed       Past Surgical History:   Procedure Laterality Date    BIOPSY OF THYROID,PERCUT      benign    BONE MARROW BIOPSY  207    benign    HEMORRHOIDECTOMY  2013    Procedure: HEMORRHOIDECTOMY;  Surgeon: MALIK Allen MD;  Location: [facility] MAIN OR          OTHER SURGICAL HISTORY      lap for ectopic after Delivery  with D&C    OTHER SURGICAL HISTORY  10/2015    IR Cerebral Angiogram: No intracranial Aneurysm    THYROIDECTOMY  2020         Social History     Socioeconomic History    Marital status:    Tobacco Use    Smoking status: Never    Smokeless tobacco: Never   Vaping Use    Vaping Use: Never used   Substance and Sexual Activity    Alcohol use: Yes     Alcohol/week: 0.0 standard drinks of alcohol     Comment: rare    Drug use: No   Other Topics Concern    Caffeine Concern Yes     Comment: occasionally soda    Exercise Yes     Comment: walking    Seat Belt Yes                   REVIEW OF SYSTEMS:  GENERAL HEALTH: feels well otherwise, mood is good  SKIN: denies any unusual skin lesions or rashes  RESPIRATORY: denies shortness of breath with exertion, no cough  CARDIOVASCULAR: As above.    GI: denies abdominal pain and denies heartburn  : normal bladder  NEURO: as above    EXAM:  /62   Pulse 95   Temp 97.2 °F (36.2 °C) (Temporal)   Resp 18   Ht 5' 3\" (1.6 m)   Wt 180 lb (81.6 kg)   LMP 2024 (Exact Date)   SpO2 95%   BMI 31.89 kg/m²    Wt Readings from Last 6 Encounters:   01/26/24 180 lb (81.6 kg)   10/10/23 175 lb 2 oz (79.4 kg)   08/28/23 167 lb 12.8 oz (76.1 kg)   04/27/23 176 lb (79.8 kg)   03/22/23 179 lb (81.2 kg)   02/15/23 173 lb 8 oz (78.7 kg)     GENERAL: well developed, well nourished,in no apparent distress, slightly anxious appearing, pleasant affect  SKIN: no rashes,no suspicious lesions  HEENT: atraumatic, normocephalic,  Conj clear, EOMI, PERRL  NECK: supple,no adenopathy,noTM  LUNGS: clear to auscultation  CARDIO: RRR without murmur, normal S1, S2, no ectopy  Palpable focal chest wall tenderness at left upper medial chest and middle of rib cage under breast.  Notes pain in these regions with deep breaths as well during lung exam.  No deformity.  No ecchymosis.  GI: NABS, soft, no tenderness, no masses, no HSM, ND  EXTREMITIES: no cyanosis, clubbing or edema  Mild left anterolateral shoulder tenderness.  Good range of motion but limited internal rotation and elevation compared to right side.  N/V intact  NEURO: A&O x3, no focal deficits  Normal gait    ASSESSMENT AND PLAN:    1. Other chest pain  - EKG with interpretation and Report -IN OFFICE [18703]: Normal sinus rhythm, no ST-T wave changes.  WNL  Clinically musculoskeletal/costochondritis.  Patient with palpable focal tenderness on exam.  Meloxicam and tizanidine as directed.  Limit activities that exacerbate pain.  Did advise regular deep breathing exercises while awake  Patient is very concerned about chest symptoms and possible cardiac cause of symptoms.  - CARD ECHO STRESS ECHO/REST AND STRESS(CPT=93350/43827 DMG 82221); Future  - XR RIBS WITH CHEST, BILATERAL   (CPT=71111); Future  - CBC With Differential With Platelet; Future  - Comp Metabolic Panel (14); Future  - Meloxicam 15 MG Oral Tab; 1 p.o. daily for 1 week, then 1 p.o. daily as needed.  Take with food.  Dispense: 20 tablet; Refill: 0, discontinue if notes any GI side effects  Stop ibuprofen OTC.  Okay to use  Tylenol extra strength as needed for breakthrough pain.  Warm compress twice daily/as needed  - tiZANidine 2 MG Oral Tab; 1 tablet po at bedtime for one week, then 1 po at bedtime/prn  Dispense: 20 tablet; Refill: 1 will sedation.    2. Tendinitis of left shoulder  Symptoms mild.  May be related to radiation from chest symptoms as above.  Meloxicam and tizanidine as above should help.  HEP.    3. Vitamin D deficiency  Take Vitamin D 3 supplement 2000 international units  daily, at least 6 hours after levothyroxine dose.     4. Postsurgical hypothyroidism  Continue current levothyroxine dose.  Check labs as ordered.  - TSH and Free T4; Future    5. Migraine without aura and without status migrainosus, not intractable  Reviewed importance of regular, balanced meals and good sleep hygiene.  Defers daily prophylactic medication.  Fioricet as needed.    6. Prediabetes  7. Dyslipidemia  Reviewed diet/exercise.  Monitor with TLC.  - Comp Metabolic Panel (14); Future  - Hemoglobin A1C; Future  - Lipid Panel; Future    8. Encounter for medication monitoring  - TSH and Free T4; Future  - CBC With Differential With Platelet; Future  - Comp Metabolic Panel (14); Future

## 2024-01-29 ENCOUNTER — TELEPHONE (OUTPATIENT)
Dept: HEMATOLOGY/ONCOLOGY | Facility: HOSPITAL | Age: 49
End: 2024-01-29

## 2024-01-29 DIAGNOSIS — Z82.49 FAMILY HISTORY OF BLOOD CLOTS: Primary | ICD-10-CM

## 2024-01-29 DIAGNOSIS — D75.89 MACROCYTOSIS: ICD-10-CM

## 2024-02-01 ENCOUNTER — HOSPITAL ENCOUNTER (OUTPATIENT)
Dept: GENERAL RADIOLOGY | Age: 49
Discharge: HOME | End: 2024-02-01
Attending: FAMILY MEDICINE
Payer: COMMERCIAL

## 2024-02-01 DIAGNOSIS — R07.89 OTHER CHEST PAIN: ICD-10-CM

## 2024-02-01 PROCEDURE — 71111 X-RAY EXAM RIBS/CHEST4/> VWS: CPT | Performed by: FAMILY MEDICINE

## 2024-02-06 ENCOUNTER — TELEPHONE (OUTPATIENT)
Dept: FAMILY MEDICINE CLINIC | Facility: CLINIC | Age: 49
End: 2024-02-06

## 2024-02-07 NOTE — TELEPHONE ENCOUNTER
Spoke to patient, she reports having shoulder pain and chest pain (comes and goes). Has stress test scheduled.     Taking Meloxicam - helps a little. Haven't started muscle relaxant. Advised to try that to see if that will help.     She was advised to call us if her symptoms persist, or worsen. Agreed with POC.

## 2024-02-08 ENCOUNTER — HOSPITAL ENCOUNTER (OUTPATIENT)
Dept: CV DIAGNOSTICS | Facility: HOSPITAL | Age: 49
Discharge: HOME | End: 2024-02-08
Attending: FAMILY MEDICINE
Payer: COMMERCIAL

## 2024-02-08 DIAGNOSIS — R07.89 OTHER CHEST PAIN: ICD-10-CM

## 2024-02-08 PROCEDURE — 93350 STRESS TTE ONLY: CPT | Performed by: FAMILY MEDICINE

## 2024-02-08 PROCEDURE — 93018 CV STRESS TEST I&R ONLY: CPT | Performed by: FAMILY MEDICINE

## 2024-02-08 PROCEDURE — 93017 CV STRESS TEST TRACING ONLY: CPT | Performed by: FAMILY MEDICINE

## 2024-02-19 ENCOUNTER — TELEPHONE (OUTPATIENT)
Dept: FAMILY MEDICINE CLINIC | Facility: CLINIC | Age: 49
End: 2024-02-19

## 2024-02-19 NOTE — TELEPHONE ENCOUNTER
Pt called and wanted to know the results of the blood tests on 1/26/24. She also wants to discuss her migraine medication and see about changing to something w/o acetaminophen since it gives her more migraines

## 2024-02-20 RX ORDER — RIZATRIPTAN BENZOATE 10 MG/1
TABLET, ORALLY DISINTEGRATING ORAL
Qty: 12 TABLET | Refills: 2 | Status: SHIPPED | OUTPATIENT
Start: 2024-02-20

## 2024-02-20 NOTE — TELEPHONE ENCOUNTER
Returned call to patient to see where labs were done.  No results in chart.  She states she is asking about the Echo Stress test result, not lab results.  Advised Cardiac Echo Stress test was normal per Dr. Bautista.  A copy has been sent to her home with a note that it's normal.    States she had a migraine yesterday and took a medication with acetaminophen in it. Immediately her throat became itchy, her tongue and the roof of her mouth was itchy and tingling and she started coughing.  Took an antihistamine right away.  States has had a similar reaction to acetaminophen in the past, only worse this time.  Advised NOT to take any more medications with acetaminophen in it.  We will radha it as an allergy on her chart.  Advised to be very aware of reading the label on any OTC meds to made sure they don't have it listed in the active ingredients.      Reviewed order from Dr. Ramsay for rizatriptan for migraines. Informed it does not contain any acetaminophen. States she never picked up the prescription.  Advised to call [facility] to see if rx is still active.  If not, have them send refill request to Dr. Ramsay's office.  Verbalizes understanding.

## 2024-02-20 NOTE — TELEPHONE ENCOUNTER
Medication: Rizatriptan 10 mg     Date of last refill: 08/28/2023  [place]      Last office visit: 08/28/2023  Due back to clinic per last office note:    Date next office visit scheduled:    Future Appointments   Date Time Provider Department Center   3/6/2024  5:00 AM PF US [facility] PF US [place]           Last OV note recommendation:    Impression/Plan:  Chronic daily headache  (primary encounter diagnosis)  Migraine with aura and without status migrainosus, not intractable  Menstrual migraine without status migrainosus, not intractable  Allergic sinusitis        Migraines with aura (visual or sensory)- has also had hemiplegic migraine  Triggers of periods  Need new rescue medication  Switch from fiorcet to maxalt  Can add ibuprofen     Educated on triggers and lifestyle behaviors including limiting caffeine intake, not skipping meals, having regular sleep schedule and practicing good sleep hygiene, avoid migraine food triggers/try to identify if any of them are triggers (nitrates, aged cheeses, red wine, chocolate, msg, artificial sweetener). Also discussed medication overuse headache including taking abortive medications, or any combination of them, on more than 2-3 days of the week, and to take abortive medication immediately at onset of headache        Chronic daily headaches- etiology unclear, MRI brain 2021 normal. Headaches are not worse when allergic sinusitis. However, frontal headache may be part triggered by chronic sinusitis. Recommend seeing an ENT.     Requested Prescriptions             Signed Prescriptions Disp Refills    rizatriptan 10 MG Oral Tablet Dispersible 12 tablet 0       Sig: use at onset; may repeat once after 2 hours- ONLY 2 IN 24 HOUR PERIOD MAX. This is a 30 day supply.            We discussed in depth regarding the diagnosis, prognosis, treatment. The patient was given ample opportunity to ask questions. All questions and concerns were addressed.       Stephanie Ramsay DO  Neuromuscular and General Neurology  [facility]         We discussed in depth regarding the diagnosis, prognosis, treatment. The patient was given ample opportunity to ask questions. All questions and concerns were addressed. 35 minutes were spent on this encounter, which included obtaining and reviewing history, examining the patient, reviewing and interpreting results, building a treatment plan, discussing treatment options, discussing medication instructions, educating the patient/family/caregiver, and completing documentation.         Stephanie Ramsay DO  Neuromuscular and General Neurology  [facility]

## 2024-03-13 DIAGNOSIS — E89.0 POSTSURGICAL HYPOTHYROIDISM: ICD-10-CM

## 2024-03-14 DIAGNOSIS — E89.0 POSTSURGICAL HYPOTHYROIDISM: ICD-10-CM

## 2024-03-14 RX ORDER — LEVOTHYROXINE SODIUM 88 UG/1
TABLET ORAL
Qty: 30 TABLET | Refills: 0 | Status: SHIPPED | OUTPATIENT
Start: 2024-03-14 | End: 2024-04-18

## 2024-03-14 NOTE — TELEPHONE ENCOUNTER
Thyroid Medication Protocol [id number]/13/2024 10:21 PM   Protocol Details TSH in past 12 months    Last TSH value is normal    In person appointment or virtual visit in the past 12 mos or appointment in next 3 mos     LOV 1/26/2024    No future appointments.    LAST LAB 2/3/23

## 2024-03-14 NOTE — TELEPHONE ENCOUNTER
Medication refilled for 1 month only.  Patient is very much overdue to get her labs done.  Last TFTs done 2/2023.  Please notify patient to get labs done as soon as possible as previously ordered.  Schedule appointment for physical as well.

## 2024-03-15 RX ORDER — LEVOTHYROXINE SODIUM 88 UG/1
88 TABLET ORAL
Qty: 90 TABLET | Refills: 0 | OUTPATIENT
Start: 2024-03-15

## 2024-03-15 NOTE — TELEPHONE ENCOUNTER
VML for Patient - asked her to call office. Did not leave a detailed message due to no identifiers on VM.  Pt not on my Chart    Blood work to be done - it was entered 1-26-24.  Physical due - last done 8-16-22

## 2024-04-11 ENCOUNTER — LAB ENCOUNTER (OUTPATIENT)
Dept: LAB | Age: 49
End: 2024-04-11
Attending: FAMILY MEDICINE
Payer: COMMERCIAL

## 2024-04-11 DIAGNOSIS — R07.89 OTHER CHEST PAIN: ICD-10-CM

## 2024-04-11 DIAGNOSIS — Z51.81 ENCOUNTER FOR MEDICATION MONITORING: ICD-10-CM

## 2024-04-11 DIAGNOSIS — E78.5 DYSLIPIDEMIA: ICD-10-CM

## 2024-04-11 DIAGNOSIS — E89.0 POSTSURGICAL HYPOTHYROIDISM: ICD-10-CM

## 2024-04-11 DIAGNOSIS — R73.03 PREDIABETES: ICD-10-CM

## 2024-04-11 LAB
ALBUMIN SERPL-MCNC: 3.8 G/DL (ref 3.4–5)
ALBUMIN/GLOB SERPL: 1.1 {RATIO} (ref 1–2)
ALP LIVER SERPL-CCNC: 57 U/L
ALT SERPL-CCNC: 24 U/L
ANION GAP SERPL CALC-SCNC: 5 MMOL/L (ref 0–18)
AST SERPL-CCNC: 13 U/L (ref 15–37)
BASOPHILS # BLD AUTO: 0.02 X10(3) UL (ref 0–0.2)
BASOPHILS NFR BLD AUTO: 0.6 %
BILIRUB SERPL-MCNC: 0.9 MG/DL (ref 0.1–2)
BUN BLD-MCNC: 14 MG/DL (ref 9–23)
CALCIUM BLD-MCNC: 9 MG/DL (ref 8.5–10.1)
CHLORIDE SERPL-SCNC: 107 MMOL/L (ref 98–112)
CHOLEST SERPL-MCNC: 188 MG/DL (ref ?–200)
CO2 SERPL-SCNC: 29 MMOL/L (ref 21–32)
CREAT BLD-MCNC: 1.22 MG/DL
EGFRCR SERPLBLD CKD-EPI 2021: 54 ML/MIN/1.73M2 (ref 60–?)
EOSINOPHIL # BLD AUTO: 0.07 X10(3) UL (ref 0–0.7)
EOSINOPHIL NFR BLD AUTO: 2.1 %
ERYTHROCYTE [DISTWIDTH] IN BLOOD BY AUTOMATED COUNT: 12.9 %
EST. AVERAGE GLUCOSE BLD GHB EST-MCNC: 134 MG/DL (ref 68–126)
FASTING PATIENT LIPID ANSWER: YES
FASTING STATUS PATIENT QL REPORTED: YES
GLOBULIN PLAS-MCNC: 3.6 G/DL (ref 2.8–4.4)
GLUCOSE BLD-MCNC: 99 MG/DL (ref 70–99)
HBA1C MFR BLD: 6.3 % (ref ?–5.7)
HCT VFR BLD AUTO: 39.9 %
HDLC SERPL-MCNC: 37 MG/DL (ref 40–59)
HGB BLD-MCNC: 13.1 G/DL
IMM GRANULOCYTES # BLD AUTO: 0.01 X10(3) UL (ref 0–1)
IMM GRANULOCYTES NFR BLD: 0.3 %
LDLC SERPL CALC-MCNC: 137 MG/DL (ref ?–100)
LYMPHOCYTES # BLD AUTO: 1.5 X10(3) UL (ref 1–4)
LYMPHOCYTES NFR BLD AUTO: 45.6 %
MCH RBC QN AUTO: 33 PG (ref 26–34)
MCHC RBC AUTO-ENTMCNC: 32.8 G/DL (ref 31–37)
MCV RBC AUTO: 100.5 FL
MONOCYTES # BLD AUTO: 0.27 X10(3) UL (ref 0.1–1)
MONOCYTES NFR BLD AUTO: 8.2 %
NEUTROPHILS # BLD AUTO: 1.42 X10 (3) UL (ref 1.5–7.7)
NEUTROPHILS # BLD AUTO: 1.42 X10(3) UL (ref 1.5–7.7)
NEUTROPHILS NFR BLD AUTO: 43.2 %
NONHDLC SERPL-MCNC: 151 MG/DL (ref ?–130)
OSMOLALITY SERPL CALC.SUM OF ELEC: 293 MOSM/KG (ref 275–295)
PLATELET # BLD AUTO: 247 10(3)UL (ref 150–450)
POTASSIUM SERPL-SCNC: 3.6 MMOL/L (ref 3.5–5.1)
PROT SERPL-MCNC: 7.4 G/DL (ref 6.4–8.2)
RBC # BLD AUTO: 3.97 X10(6)UL
SODIUM SERPL-SCNC: 141 MMOL/L (ref 136–145)
T4 FREE SERPL-MCNC: 1 NG/DL (ref 0.8–1.7)
TRIGL SERPL-MCNC: 76 MG/DL (ref 30–149)
TSI SER-ACNC: 1.52 MIU/ML (ref 0.36–3.74)
VLDLC SERPL CALC-MCNC: 14 MG/DL (ref 0–30)
WBC # BLD AUTO: 3.3 X10(3) UL (ref 4–11)

## 2024-04-11 PROCEDURE — 80061 LIPID PANEL: CPT

## 2024-04-11 PROCEDURE — 83036 HEMOGLOBIN GLYCOSYLATED A1C: CPT

## 2024-04-11 PROCEDURE — 80053 COMPREHEN METABOLIC PANEL: CPT

## 2024-04-11 PROCEDURE — 84443 ASSAY THYROID STIM HORMONE: CPT

## 2024-04-11 PROCEDURE — 84439 ASSAY OF FREE THYROXINE: CPT

## 2024-04-11 PROCEDURE — 36415 COLL VENOUS BLD VENIPUNCTURE: CPT

## 2024-04-11 PROCEDURE — 85025 COMPLETE CBC W/AUTO DIFF WBC: CPT

## 2024-04-17 ENCOUNTER — OFFICE VISIT (OUTPATIENT)
Dept: FAMILY MEDICINE CLINIC | Facility: CLINIC | Age: 49
End: 2024-04-17
Payer: COMMERCIAL

## 2024-04-17 VITALS
SYSTOLIC BLOOD PRESSURE: 110 MMHG | TEMPERATURE: 97 F | BODY MASS INDEX: 31.89 KG/M2 | OXYGEN SATURATION: 95 % | RESPIRATION RATE: 16 BRPM | DIASTOLIC BLOOD PRESSURE: 60 MMHG | WEIGHT: 180 LBS | HEIGHT: 63 IN | HEART RATE: 90 BPM

## 2024-04-17 DIAGNOSIS — E55.9 VITAMIN D DEFICIENCY: ICD-10-CM

## 2024-04-17 DIAGNOSIS — R79.89 ELEVATED SERUM CREATININE: ICD-10-CM

## 2024-04-17 DIAGNOSIS — G89.29 CHRONIC PAIN OF RIGHT ELBOW: ICD-10-CM

## 2024-04-17 DIAGNOSIS — Z01.419 WELL WOMAN EXAM WITH ROUTINE GYNECOLOGICAL EXAM: Primary | ICD-10-CM

## 2024-04-17 DIAGNOSIS — R21 FACIAL RASH: ICD-10-CM

## 2024-04-17 DIAGNOSIS — M77.8 TENDINITIS OF LEFT SHOULDER: ICD-10-CM

## 2024-04-17 DIAGNOSIS — E78.5 DYSLIPIDEMIA: ICD-10-CM

## 2024-04-17 DIAGNOSIS — D72.818 OTHER DECREASED WHITE BLOOD CELL (WBC) COUNT: ICD-10-CM

## 2024-04-17 DIAGNOSIS — E89.0 POSTSURGICAL HYPOTHYROIDISM: ICD-10-CM

## 2024-04-17 DIAGNOSIS — R73.03 PREDIABETES: ICD-10-CM

## 2024-04-17 DIAGNOSIS — G89.29 CHRONIC LEFT SHOULDER PAIN: ICD-10-CM

## 2024-04-17 DIAGNOSIS — M25.50 POLYARTHRALGIA: ICD-10-CM

## 2024-04-17 DIAGNOSIS — D75.89 MACROCYTOSIS WITHOUT ANEMIA: ICD-10-CM

## 2024-04-17 DIAGNOSIS — M25.521 CHRONIC PAIN OF RIGHT ELBOW: ICD-10-CM

## 2024-04-17 DIAGNOSIS — M79.675 GREAT TOE PAIN, LEFT: ICD-10-CM

## 2024-04-17 DIAGNOSIS — M25.512 CHRONIC LEFT SHOULDER PAIN: ICD-10-CM

## 2024-04-17 DIAGNOSIS — G43.109 COMPLICATED MIGRAINE: ICD-10-CM

## 2024-04-17 DIAGNOSIS — Z51.81 ENCOUNTER FOR MEDICATION MONITORING: ICD-10-CM

## 2024-04-17 PROCEDURE — 88175 CYTOPATH C/V AUTO FLUID REDO: CPT | Performed by: FAMILY MEDICINE

## 2024-04-17 PROCEDURE — 99213 OFFICE O/P EST LOW 20 MIN: CPT | Performed by: FAMILY MEDICINE

## 2024-04-17 PROCEDURE — 3078F DIAST BP <80 MM HG: CPT | Performed by: FAMILY MEDICINE

## 2024-04-17 PROCEDURE — 3074F SYST BP LT 130 MM HG: CPT | Performed by: FAMILY MEDICINE

## 2024-04-17 PROCEDURE — 87624 HPV HI-RISK TYP POOLED RSLT: CPT | Performed by: FAMILY MEDICINE

## 2024-04-17 PROCEDURE — 99396 PREV VISIT EST AGE 40-64: CPT | Performed by: FAMILY MEDICINE

## 2024-04-17 PROCEDURE — 3008F BODY MASS INDEX DOCD: CPT | Performed by: FAMILY MEDICINE

## 2024-04-17 NOTE — PROGRESS NOTES
Td Quintero is a 48 year old female.  HPI:  Pt is here for routine physical and has multiple issues to discuss.  Still has chest pain, had bad episode yesterday when was getting ready to take a shower.  Has soreness in left side of chest  L shoulder pain and limited ROM for over one year,   Pain sometimes radiates to L elbow, no nunbness/tingling  Does some home exercises which helped.  If does not do stretches for shoulder has difficulty with actviies like putting on clothes.  R elbow joint pain for 1-2 months  R knee pain, h/o R knee OA  L big toe pain on/off for one month. No h/o injury, no swelling. Uncle has h/o gout.   H/o rash under eyes and bilat upper cheeks. Thinks rosacea  Gets some redness and burning in area on/off.  Symptoms chronic, intermittent.  Using facial moisturizer.  Doing some exercise on/off.  Some flare of right knee pain so stopped.  Some wt gain  Diet healthy.   No bad migraines but does keep slight daily headache that is usual and chronic for patient w/o change.   No vision changes.   unable to take Tylenol, as gets some itchiness but has had more severe reaction with some throat closing with medication as well.. Had to take Allegra to get relief.  Takes Allegra as needed for seasonal allergies.  Taking Ibuprofen prn.   Hearing okay.  Has to reschedule colonoscopy  Menses regular, normal flow.  Last 4-7 days.  Sometimes shorter.  No intermenstrual bleeding.  No abnormal vaginal discharge.  No pelvic pain.  H/o abnrml PAP and cryo years ago.  Subsequent Paps normal.  ,  x 4, 1 ectopic.  Normal BMs  Normal bladder  Some increase in urinary frequency lately, but no dysuria.  0-1 times ncoturia  No breast complaints now , prev sxs resolved  Had recent URI sxs 3 weeks and noted some swelling in preauricular reegion and some R inguinal lymph ndoes  Feeling fine on current levothyroxine dose daily.  No unusual fatigue.  Sleep is normal for pt, about 4-6 hours/night. Some snoring, no  sleep disordered breathing      Current Outpatient Medications   Medication Sig Dispense Refill    levothyroxine 88 MCG Oral Tab TAKE 1 TABLET(88 MCG) BY MOUTH BEFORE BREAKFAST 30 tablet 0    rizatriptan 10 MG Oral Tablet Dispersible DISSOLVE 1 TABLET BY MOUTH AT ONSET OF HEADACHE. REPEAT AFTER 2 HOURS IF NEEDED.(MAX OF 2 TABLETS IN 24 HOURS) 12 tablet 2    Fexofenadine HCl (ALLEGRA ODT OR) Take 180 mg by mouth as needed.                    Past Medical History:    Acute maxillary sinusitis    Acute, but ill-defined, cerebrovascular disease    Allergic rhinitis    Anemia    Costochondritis    Dysphagia, unspecified(787.20)    History of abnormal Pap smear    s/p cryo    Leukocytopenia, unspecified    Lump or mass in breast    Migraine    Migraines    Nontoxic multinodular goiter    s/p thyroid bx  & 10/2008, both benign     Osteoarthrosis, unspecified whether generalized or localized, unspecified site    Right Knee, Spine    Other chest pain    Prediabetes    per pt.     TIA (transient ischemic attack)    Unspecified disorder of thyroid    MNG    Visual impairment    glasses as needed       Past Surgical History:   Procedure Laterality Date    Biopsy of thyroid,percut      benign    Bone marrow biopsy  207    benign    Hemorrhoidectomy  2013    Procedure: HEMORRHOIDECTOMY;  Surgeon: MALIK Allen MD;  Location: [facility] OR          Other surgical history      lap for ectopic after Delivery  with D&C    Other surgical history  10/2015    IR Cerebral Angiogram: No intracranial Aneurysm    Thyroidectomy  2020         Social History     Socioeconomic History    Marital status:    Tobacco Use    Smoking status: Never    Smokeless tobacco: Never   Vaping Use    Vaping status: Never Used   Substance and Sexual Activity    Alcohol use: Yes     Alcohol/week: 0.0 standard drinks of alcohol     Comment: rare    Drug use: No   Other Topics Concern    Caffeine Concern Yes     Comment: occasionally soda     Exercise Yes     Comment: walking    Seat Belt Yes                   REVIEW OF SYSTEMS:  GENERAL HEALTH: feels well otherwise, mood is good  SKIN: As above  RESPIRATORY: denies shortness of breath with exertion, no cough  CARDIOVASCULAR: denies chest pain on exertion  GI: denies abdominal pain and denies heartburn  : normal bladder  NEURO: denies headaches, denies dizziness    EXAM:  /60   Pulse 90   Temp 97.3 °F (36.3 °C) (Temporal)   Resp 16   Ht 5' 3\" (1.6 m)   Wt 180 lb (81.6 kg)   LMP 01/25/2024 (Exact Date)   SpO2 95%   BMI 31.89 kg/m²   Wt Readings from Last 6 Encounters:   04/17/24 180 lb (81.6 kg)   01/26/24 180 lb (81.6 kg)   10/10/23 175 lb 2 oz (79.4 kg)   08/28/23 167 lb 12.8 oz (76.1 kg)   04/27/23 176 lb (79.8 kg)   03/22/23 179 lb (81.2 kg)     GENERAL: well developed, well nourished,in no apparent distress, pleasant affect  SKIN: acne scars on face/UEs, trunk. No active lesions noted.   Faint macular erythema of upper medial cheeks.   HEENT: atraumatic, normocephalic,  Conj clear, EOMI, PERRL  TMs:clear bilat  OMM, throat clear  NECK: supple,no adenopathy, healed scar.  LUNGS: clear to auscultation  CARDIO: RRR without murmur  Mild palpable left chest wall tenderness, which seems continuous with left shoulder tenderness.  Overall decreased versus previous.  GI: NABS, soft, obese, no tenderness, no masses, no HSM, ND  EXTREMITIES: no cyanosis, clubbing or edema  Left shoulder with some anterolateral tenderness.  Limited abduction and internal rotation.  Right elbow with mild medial tenderness.  Full range of motion.  No swelling.  N/V intact  Left first toe with mild tenderness over MTP joint.  No swelling.  No erythema.  Good range of motion.  NEURO: A&O x3, no focal deficits  Normal gait  Breast exam: symmetric, few scattered fibrocystic changes bilaterally, no dominant masses, no nipple or skin changes, no axillary or SC LAD   Pelvic: no external lesions , No bladder prolapse.  Normal EGBUS, normal d/c. Normal cervix w/o lesions, no CMT   Uterus and  adnexa normal w/o masses or tenderness.   No anal or perianal lesions note    ASSESSMENT AND PLAN:    1. Well woman exam with routine gynecological exam  Reviewed diet/exercise/skin care/safety/BSE/BMI goals  Reviewed immunizations: Tdap up-to-date.  Advised annual flu vaccine seasonally.  Discussed COVID-19 booster dose.  Advised Shingrix vaccine series.  Reviewed indication and risks of not being vaccinated.  Patient defers at this time, understanding risk of not being vaccinated.  PAP smear done today with HPV testing  MMG Up-to-date.  Last done 10/2023: Benign findings.  Has right breast cyst.  No significant associated symptoms at this time.  Reviewed, because of breast density, may benefit from supplemental screening.  Discussed options.  Deferred at this time.  Advised to reschedule colonoscopy.  - ThinPrep PAP with HPV Reflex Request; Future    2. Chronic left shoulder pain  3. Tendinitis of left shoulder  In light of ongoing symptoms, affecting daily activities, recommend imaging and physical therapy.  Left-sided chest symptoms likely related to this.  Local ice/heat, alternate as needed.  Topical analgesics as needed.  Advised HEP.  - XR SHOULDER, COMPLETE (MIN 2 VIEWS), LEFT (CPT=73030); Future  - Physical Therapy Referral - [facility]    4. Chronic pain of right elbow  Recommend elbow brace daily for 7-10 days, remove in PM.  Limit use of right upper extremity for 1-2 weeks.  Local ice/heat, alternate as needed.  Topical analgesics as needed.    5. Great toe pain, left  Reassured, clinically does not appear to be gout.  Should follow-up at time of flareup.  Comfortable, cushioned footwear.  Avoid barefoot.  Monitor.    6. Postsurgical hypothyroidism  TFTs normal.  Continue current levothyroxine dose    7. Prediabetes  Labs reviewed with normal fasting blood sugar.  Hemoglobin A1c elevated at 6.3, higher than  previous.  Reviewed diet/exercise/A1c goals.  Prefers to avoid medication.  Monitor with TLC.  Plan recheck labs in about 6 months.    8. Macrocytosis without anemia  Patient without regular alcohol intake.  Check vitamin levels.  Schedule Abd US as prev ordered by Hematology  - Vitamin B12 [E]; Future  - Folic Acid Serum [E]; Future  - Ferritin [E]; Future    9. Other decreased white blood cell (WBC) count  Stable for patient. Intermittent.  Clinically benign.  Has seen hematologist.  Previous bone marrow bx normal.    10. Facial rash  Regular sunscreen use.  - metRONIDAZOLE (METROCREAM) 0.75 % External Cream; Apply to aa bid as directed  Dispense: 45 g; Refill: 0  - Connective Tissue Disease (JUJU) Screen [E]; Future    11. Polyarthralgia  HEP.  Topical analgesics as needed.  - Connective Tissue Disease (JUJU) Screen [E]; Future  - Sed Rate, Westergren (Automated) [E]; Future  - C-Reactive Protein [E]; Future  - Rheumatoid Arthritis Factor [E]; Future  - Cyclic Citrullinate Pep. IGG [E]; Future    12. Vitamin D deficiency  - Vitamin D; Future    13.  Dyslipidemia.  Lipid panel with HDL low.  Triglycerides normal.  LDL elevated at 137.  Reviewed diet/exercise/lipid goals.  Monitor.    14.  Elevated creatinine  Improved compared to previous.  Electrolytes normal.  Keep well-hydrated.  Monitor.    15.  Complicated migraine  No recent flareups.    16.  Encounter for medication monitoring

## 2024-04-18 RX ORDER — LEVOTHYROXINE SODIUM 88 UG/1
TABLET ORAL
Qty: 90 TABLET | Refills: 0 | Status: SHIPPED | OUTPATIENT
Start: 2024-04-18

## 2024-04-18 NOTE — TELEPHONE ENCOUNTER
Thyroid Medication Protocol [id number]/17/2024 08:28 PM   Protocol Details TSH in past 12 months    Last TSH value is normal    In person appointment or virtual visit in the past 12 mos or appointment in next 3 mos     LOV 4/17/2024    No future appointments.    LAST LAB 4/11/24

## 2024-04-22 LAB
.: NORMAL
.: NORMAL

## 2024-04-22 NOTE — TELEPHONE ENCOUNTER
Received call from [facility] stating they did not receive order for Levothyroxine on 4/18/24.  VO given as written by Dr. Bautista:    LEVOTHYROXINE 88 MCG Oral Tab 90 tablet 0 4/18/2024 --    Sig: TAKE 1 TABLET(88 MCG) BY MOUTH BEFORE BREAKFAST    Sent to pharmacy as: Levothyroxine Sodium 88 MCG Oral Tablet (Synthroid)

## 2024-04-22 NOTE — TELEPHONE ENCOUNTER
Pt called and states she does not have any pills left and has never missed a pill. She does not have any today. Please refill ASAP

## 2024-04-22 NOTE — TELEPHONE ENCOUNTER
Pt is concerned, because she called the pharmacy ([facility]) and they informed her that they have not received the refill request for the following medication:           LEVOTHYROXINE 88 MCG Oral Tab 90 tablet 0 4/18/2024 --   Sig:   TAKE 1 TABLET(88 MCG) BY MOUTH BEFORE BREAKFAST     Route:   (none)     Note to Pharmacy:   **Patient requests 90 days supply**     Order #:   [id number]       Pt takes this medication every morning on a empty stomach so the medication can be absorbed.  Pt is requesting the clinical staff to call the pharmacy so she can get the refill for this medication today.

## 2024-04-23 LAB — HPV I/H RISK 1 DNA SPEC QL NAA+PROBE: NEGATIVE

## 2024-04-29 ENCOUNTER — TELEPHONE (OUTPATIENT)
Dept: FAMILY MEDICINE CLINIC | Facility: CLINIC | Age: 49
End: 2024-04-29

## 2024-05-01 ENCOUNTER — LAB ENCOUNTER (OUTPATIENT)
Dept: LAB | Age: 49
End: 2024-05-01
Attending: FAMILY MEDICINE
Payer: COMMERCIAL

## 2024-05-01 ENCOUNTER — HOSPITAL ENCOUNTER (OUTPATIENT)
Dept: GENERAL RADIOLOGY | Age: 49
Discharge: HOME | End: 2024-05-01
Attending: FAMILY MEDICINE
Payer: COMMERCIAL

## 2024-05-01 DIAGNOSIS — E55.9 VITAMIN D DEFICIENCY: ICD-10-CM

## 2024-05-01 DIAGNOSIS — M25.512 CHRONIC LEFT SHOULDER PAIN: ICD-10-CM

## 2024-05-01 DIAGNOSIS — D75.89 MACROCYTOSIS WITHOUT ANEMIA: ICD-10-CM

## 2024-05-01 DIAGNOSIS — G89.29 CHRONIC LEFT SHOULDER PAIN: ICD-10-CM

## 2024-05-01 DIAGNOSIS — M25.50 POLYARTHRALGIA: ICD-10-CM

## 2024-05-01 DIAGNOSIS — R21 FACIAL RASH: ICD-10-CM

## 2024-05-01 LAB
CRP SERPL-MCNC: <0.29 MG/DL (ref ?–0.3)
DEPRECATED HBV CORE AB SER IA-ACNC: 49 NG/ML
ERYTHROCYTE [SEDIMENTATION RATE] IN BLOOD: 17 MM/HR
FOLATE SERPL-MCNC: 8.3 NG/ML (ref 8.7–?)
RHEUMATOID FACT SERPL-ACNC: <10 IU/ML (ref ?–15)
VIT B12 SERPL-MCNC: 563 PG/ML (ref 193–986)
VIT D+METAB SERPL-MCNC: 7 NG/ML (ref 30–100)

## 2024-05-01 PROCEDURE — 86225 DNA ANTIBODY NATIVE: CPT

## 2024-05-01 PROCEDURE — 82306 VITAMIN D 25 HYDROXY: CPT

## 2024-05-01 PROCEDURE — 85652 RBC SED RATE AUTOMATED: CPT

## 2024-05-01 PROCEDURE — 82728 ASSAY OF FERRITIN: CPT

## 2024-05-01 PROCEDURE — 86200 CCP ANTIBODY: CPT

## 2024-05-01 PROCEDURE — 73030 X-RAY EXAM OF SHOULDER: CPT | Performed by: FAMILY MEDICINE

## 2024-05-01 PROCEDURE — 82607 VITAMIN B-12: CPT

## 2024-05-01 PROCEDURE — 86431 RHEUMATOID FACTOR QUANT: CPT

## 2024-05-01 PROCEDURE — 86038 ANTINUCLEAR ANTIBODIES: CPT

## 2024-05-01 PROCEDURE — 86140 C-REACTIVE PROTEIN: CPT

## 2024-05-01 PROCEDURE — 82746 ASSAY OF FOLIC ACID SERUM: CPT

## 2024-05-01 PROCEDURE — 36415 COLL VENOUS BLD VENIPUNCTURE: CPT

## 2024-05-02 ENCOUNTER — TELEPHONE (OUTPATIENT)
Dept: FAMILY MEDICINE CLINIC | Facility: CLINIC | Age: 49
End: 2024-05-02

## 2024-05-02 LAB
CCP IGG SERPL-ACNC: 1.2 U/ML (ref 0–6.9)
DSDNA IGG SERPL IA-ACNC: 4.4 IU/ML
ENA AB SER QL IA: 0.1 UG/L
ENA AB SER QL IA: NEGATIVE

## 2024-05-02 NOTE — TELEPHONE ENCOUNTER
A couple labs still pending.  Once resulted please review lab and XR results and advise. Thank you.

## 2024-05-03 RX ORDER — FOLIC ACID 1 MG/1
1 TABLET ORAL DAILY
Qty: 90 TABLET | Refills: 0 | Status: SHIPPED | OUTPATIENT
Start: 2024-05-03

## 2024-05-03 RX ORDER — ERGOCALCIFEROL 1.25 MG/1
50000 CAPSULE ORAL WEEKLY
Qty: 12 CAPSULE | Refills: 0 | Status: SHIPPED | OUTPATIENT
Start: 2024-05-03

## 2024-05-03 NOTE — TELEPHONE ENCOUNTER
Please notify patient of lab results and x-ray results as per Result Notes.   Will need prescription vitamin D and folic acid supplements.  Prescriptions sent to pharmacy on file.

## 2024-05-17 ENCOUNTER — TELEPHONE (OUTPATIENT)
Dept: PHYSICAL THERAPY | Facility: HOSPITAL | Age: 49
End: 2024-05-17

## 2024-05-21 ENCOUNTER — OFFICE VISIT (OUTPATIENT)
Dept: PHYSICAL THERAPY | Age: 49
End: 2024-05-21
Attending: FAMILY MEDICINE

## 2024-05-21 ENCOUNTER — TELEPHONE (OUTPATIENT)
Dept: PHYSICAL THERAPY | Facility: HOSPITAL | Age: 49
End: 2024-05-21

## 2024-05-21 DIAGNOSIS — M77.8 TENDINITIS OF LEFT SHOULDER: ICD-10-CM

## 2024-05-21 DIAGNOSIS — M25.512 CHRONIC LEFT SHOULDER PAIN: Primary | ICD-10-CM

## 2024-05-21 DIAGNOSIS — G89.29 CHRONIC LEFT SHOULDER PAIN: Primary | ICD-10-CM

## 2024-05-21 PROCEDURE — 97161 PT EVAL LOW COMPLEX 20 MIN: CPT

## 2024-05-21 PROCEDURE — 97110 THERAPEUTIC EXERCISES: CPT

## 2024-05-21 NOTE — PROGRESS NOTES
SHOULDER EVALUATION:     Diagnosis:   Chronic left shoulder pain (M25.512,G89.29)  Tendinitis of left shoulder (M77.8)      Referring Provider: Ti Batuista  Date of Evaluation:    5/21/2024    Precautions:  None Next MD visit:   none scheduled  Date of Surgery: n/a     PATIENT SUMMARY   Td Quintero is a 48 year old female who presents to therapy today with complaints of L shoulder pain, worsening. Reports incident back in Sept carrying bed foot and head boards down the stairs under her arm, sudden step and ended up jamming boards up into shoulder.   Pain had progressed, radiated down the arm, tingling and pain into the chest; followed up with PCP to discuss, rule out cardiac event. X rays taken.  R hand dominant. Also with R elbow pain that has limited functional use of UE's due to R elbow and L shoulder pain. Reports doctor advised sling for R pain but not helpful.    Pt describes pain level current 8/10, at best 6/10, at worst 10/10 sharp at times  Current functional limitations include pain/difficulty reaching especially certain directions, out to the side, avoid laying on L side/painful. Limited carrying/lifting. 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    Td describes prior level of function independent. Pt goals include get rid of the pain, be able to use my arms normally  Past medical history was reviewed with Td. Significant findings include OA R knee.  R elbow pain past couple months.  DDD/LBP.     has a past medical history of Acute maxillary sinusitis, Acute, but ill-defined, cerebrovascular disease, Allergic rhinitis, Anemia, Costochondritis, Dysphagia, unspecified(787.20), History of abnormal Pap smear (1990's), Leukocytopenia, unspecified, Lump or mass in breast, Migraine, Migraines, Nontoxic multinodular goiter, Osteoarthrosis, unspecified whether generalized or localized, unspecified site, Other chest pain, Prediabetes, TIA (transient ischemic attack), Unspecified disorder of  thyroid (09/2014), and Visual impairment.      ASSESSMENT  Td presents to physical therapy evaluation with primary c/o L shoulder pain for the past 8 months after an injury. The results of the objective tests and measures show decreased/painful shoulder ROM, pain limiting strength testing, (+) tenderness long head biceps tendon and into rotator cuff. (+) signs of impingment and bicep tendon irritation.  Pt also with signs of R elbow lateral epicondylitis past couple months which is affecting use of R UE to assist with exercises for her L shoulder dx as well as ADL's using UE's. She was unable to tolerate the sling that was recommended to her. Discussed trial of wrist splint or counterforce brace if pain persists; follow up with MD again as needed.  Discussed modifications of L shoulder exercises due to R elbow pain. Functional deficits include but are not limited to liited reach, carry and lifting, pain limiting day to day tasks, dressing, reaching behind back.  Signs and symptoms are consistent with diagnosis of rotator cuff syndrome and biceps tendonitis and early stages of adhesive capsulitis. Pt and PT discussed evaluation findings, pathology, POC and HEP.  Pt voiced understanding and performs HEP correctly without reported pain. Skilled Physical Therapy is medically necessary to address the above impairments and reach functional goals.     OBJECTIVE:   Observation/Posture: forward shoulders   Cervical Screen: cervical ROM WFL's all directions.  Reports chronic/hx of 2 whiplash. Cervical Quadrant (-)  Palpation: (+) L long head bicep, supra.  (+) R elbow lateral epicondyle into wrist extensors.    Sensation: intact grossly to light touch.  Pain no longer radiating to hand.      AROM: (degrees)  L shoulder pain all planes  Shoulder  Elbow   Flexion: R 160; L 100  Abduction: R 160; L 40   R elbow limitation ext -20*pain lat elbow R     PROM: (degrees)  Shoulder    Flexion: R 170; L 90* pain  ER: R 90; L  80  IR: R 70; L 50     Accessory motion: TBA  Flexibility: tightness pects    Strength/MMT:pain limiting strength testing; will cont to assess  Shoulder Elbow   Flexion: R 5/5; L 3-*pain/5  Abduction: R 4+/5; L 2+/5*pain  ER: R 4+/5; L 3/5 **pain  IR: R 4+/5; L 3/5*pain Flexion: R *pain elbow L 4/5*pain  Extension: R (+) pain elbow L 4+/5     Wrist ext strong/painful R, painless L  Wrist flex 5/5 bilat        Special tests: Speed's Test: R (-), L (+)    Butt-Lyndon Impingement Sign: R (-), L (+)                            Tennis elbow wrist extension R (+)  L (-)    Today’s Treatment and Response:   Pt education was provided on exam findings, treatment diagnosis, treatment plan, expectations, and prognosis. Pt was also provided recommendations for activity modifications, possible soreness after evaluation, modalities as needed [ice/heat], postural corrections, ergonomics, pain science education , and importance of remaining active.  Patient was instructed in and issued a HEP for:   Access Code: [email or web address]  URL: [email or web address]  Date: 05/21/2024  Prepared by: Yanci Wayne    Program Notes  Remember your good up tall posture. Shoulder blades slightly \"down and in\"Use good pillow support at night to support the armIce after the exercises 10-15 mins. You may ice 2-3x/day to help with pain.     Exercises  - Seated Scapular Retraction  - 2 x daily - 7 x weekly - 1 sets - 10 reps - 3 hold  - Standing Shoulder Flexion Wall Walk (or towel slide)  - 2 x daily - 7 x weekly - 1-2 sets - 10 reps  - Supine Shoulder Flexion AAROM with Hands Clasped  - 2 x daily - 7 x weekly - 1-2 sets - 10 reps  - Passive Arm Dangle - Gentle Pendulum  - 3 x daily - 7 x weekly - 3 sets - 10 reps    Charges: PT Eval Low Complexity, TE      Total Timed Treatment: 10 min     Total Treatment Time: 45 min   PLAN OF CARE:    Goals: (to be met in 10 visits)   (I) with HEP   Improved shoulder flex 160 deg for overhead reach.     Improved strength to enable putting light dishes into cabinets with assist of L UE.    Able to sleep uninterrupted from shoulder pain.  Improved IR ROM to 70 for easier dressing.  Improved ER to 90 for easier hair care.  Able to put on coat/shirt without pain or difficulty.    Frequency / Duration: Patient will be seen for 1-2 x/week or a total of 10 visits over a 90 day period. Treatment will include: Manual Therapy, Neuromuscular Re-education, Therapeutic Activities, Therapeutic Exercise, and Home Exercise Program instruction    Education or treatment limitation: None  Rehab Potential:good    QuickDASH Outcome Score  Score: 43.18 % (5/21/2024  5:35 PM)      Patient/Family/Caregiver was advised of these findings, precautions, and treatment options and has agreed to actively participate in planning and for this course of care.    Thank you for your referral. Please co-sign or sign and return this letter via fax as soon as possible to [phone]. If you have any questions, please contact me at Dept: [phone]    Sincerely,  Electronically signed by therapist: Yanci Wayne, PT  Physician's certification required: Yes  I certify the need for these services furnished under this plan of treatment and while under my care.    X___________________________________________________ Date____________________    Certification From: 5/21/2024  To:8/19/2024

## 2024-05-26 NOTE — TELEPHONE ENCOUNTER
LOV 3/23/2021    LAST LAB 6/29/2021    LAST RX 9/25/21 90 tablet 0 refills    Next OV No future appointments.       PROTOCOL PASSED hide

## 2024-05-27 NOTE — PROGRESS NOTES
Diagnosis:   Chronic left shoulder pain (M25.512,G89.29)  Tendinitis of left shoulder (M77.8)      Referring Provider: Ti Bautista  Date of Evaluation:    5/21/24    Precautions:  None Next MD visit:   As needed  Date of Surgery: n/a   Insurance Primary/Secondary: [employer] [place] HMO / N/A     # Auth Visits: 5 O            Subjective: c/o L shoulder pain since Sept. Hurts to move, with basic tasks.  Working through the exercises.     Pain: 8/10, at best 6/10, at worst 10/10 sharp at times    Objective:   (+) L long head bicep, supra.  (+) R elbow lateral epicondyle into wrist extensors  Speed's Test: R (-), L (+)                              Butt-Lyndon Impingement Sign: R (-), L (+)                            Tennis elbow wrist extension R (+)  L (-)      ---------------------------------  EVAL:  AROM: (degrees)  L shoulder pain all planes  Shoulder  Elbow   Flexion: R 160; L 100  Abduction: R 160; L 40    R elbow limitation ext -20*pain lat elbow R      PROM: (degrees)  Shoulder    Flexion: R 170; L 90* pain  ER: R 90; L 80  IR: R 70; L 50      QuickDASH Outcome Score  Score: 43.18 % (5/21/2024  5:35 PM)    Assessment: Long head bicep tendon highly irritable. Promoting regular icing and gentle exercise.  Pain limiting tolerance for exercises and PROM; modifications for positioning.  Significant guarding due to pain; pt tolerated self AAROM best ie on wall and supine.  Cues to slow down with exercises.   Also limited tolerance for exercises due to opp UE lateral elbow pain.          Goals:   (to be met in 10 visits)   (I) with HEP   Improved shoulder flex 160 deg for overhead reach.    Improved strength to enable putting light dishes into cabinets with assist of L UE.    Able to sleep uninterrupted from shoulder pain.  Improved IR ROM to 70 for easier dressing.  Improved ER to 90 for easier hair care.  Able to put on coat/shirt without pain or difficulty.    Plan: Patient will be seen for 1-2 x/week or a total of  10 visits over a 90 day period. Treatment will include: Manual Therapy, Neuromuscular Re-education, Therapeutic Activities, Therapeutic Exercise, and Home Exercise Program instruction  Date: 5/27/2024  TX#: 2/10 Date:                 TX#: 3/ Date:                 TX#: 4/ Date:                 TX#: 5/ Date:   Tx#: 6/   TE 38'  UBE 2.5' F 2.5' B       Pulleys flex 10x       Pendulum dangle/circles 20x each  Wall slide with opp UE assist 5x       AAROM supine flex hands clasped 5x  AA elbow flex/ext bilat  Low level forearm stretch instructed - posture breaks at desk  Scap sets 10x --> progress to low doorway stretch as magda.   HEP review  Pt ed pain relief, positioning, regular icing, pillow support, avoiding impinging positions.     Manual 5'  Attempted PROM-limited /pain  Gentle cross friction long head biceps           HEP:   Access Code: [email or web address]  URL: [email or web address]  Date: 05/21/2024  Prepared by: Yanci Wayne     Program Notes  Remember your good up tall posture. Shoulder blades slightly \"down and in\"Use good pillow support at night to support the armIce after the exercises 10-15 mins. You may ice 2-3x/day to help with pain.      Exercises  - Seated Scapular Retraction  - 2 x daily - 7 x weekly - 1 sets - 10 reps - 3 hold  - Standing Shoulder Flexion Wall Walk (or towel slide)  - 2 x daily - 7 x weekly - 1-2 sets - 10 reps  - Supine Shoulder Flexion AAROM with Hands Clasped  - 2 x daily - 7 x weekly - 1-2 sets - 10 reps  - Passive Arm Dangle - Gentle Pendulum  - 3 x daily - 7 x weekly - 3 sets - 10 reps    Charges: TEx3       Total Timed Treatment: 43 min  Total Treatment Time: 43 min

## 2024-05-28 ENCOUNTER — OFFICE VISIT (OUTPATIENT)
Dept: PHYSICAL THERAPY | Age: 49
End: 2024-05-28
Attending: FAMILY MEDICINE

## 2024-05-28 PROCEDURE — 97110 THERAPEUTIC EXERCISES: CPT

## 2024-05-30 ENCOUNTER — TELEPHONE (OUTPATIENT)
Dept: FAMILY MEDICINE CLINIC | Facility: CLINIC | Age: 49
End: 2024-05-30

## 2024-05-30 ENCOUNTER — TELEPHONE (OUTPATIENT)
Dept: PHYSICAL THERAPY | Age: 49
End: 2024-05-30

## 2024-05-30 ENCOUNTER — OFFICE VISIT (OUTPATIENT)
Dept: PHYSICAL THERAPY | Age: 49
End: 2024-05-30
Attending: FAMILY MEDICINE

## 2024-05-30 DIAGNOSIS — G89.29 CHRONIC PAIN OF RIGHT ELBOW: ICD-10-CM

## 2024-05-30 DIAGNOSIS — M25.512 CHRONIC LEFT SHOULDER PAIN: Primary | ICD-10-CM

## 2024-05-30 DIAGNOSIS — M25.521 CHRONIC PAIN OF RIGHT ELBOW: ICD-10-CM

## 2024-05-30 DIAGNOSIS — G89.29 CHRONIC LEFT SHOULDER PAIN: Primary | ICD-10-CM

## 2024-05-30 PROCEDURE — 97110 THERAPEUTIC EXERCISES: CPT

## 2024-05-30 NOTE — PROGRESS NOTES
Diagnosis:   Chronic left shoulder pain (M25.512,G89.29)  Tendinitis of left shoulder (M77.8)      Referring Provider: Ti Bautista  Date of Evaluation:    5/21/24    Precautions:  None Next MD visit:   As needed  Date of Surgery: n/a   Insurance Primary/Secondary: [employer] [place] HMO / N/A     # Auth Visits: 5 HMO           Progress Summary  Pt has attended 3 visits in Physical Therapy.   0 cancels    Subjective: \"feels very tight\". Continued pain with any use of L arm.  R elbow also very painful, has called MD.   Reports she has started icing which has helped somewhat.    Working through the exercises with pain.   Pain: 8/10, at best 6/10, at worst 10/10 sharp at times    Objective:   Active L shoulder flex 100 deg *pain   *pain  (+) R lateral epicondyle tenderness  (+) L long head bicep, supraspinatus tenderness.    Speed's Test: R (-), L (+)  Butt-Lyndon Impingement Sign: R (-), L (+)  Tennis elbow wrist extension R (+)  L (-)      ---------------------------------  EVAL:  AROM: (degrees)  L shoulder pain all planes  Shoulder  Elbow   Flexion: R 160; L 100  Abduction: R 160; L 40    R elbow limitation ext -20*pain lat elbow R      PROM: (degrees)  Shoulder    Flexion: R 170; L 90* pain  ER: R 90; L 80  IR: R 70; L 50      QuickDASH Outcome Score  Score: 43.18 % (5/21/2024  5:35 PM)    Assessment: Pt with continued high levels of pain limiting basic day to day tasks and reaching.  Pt with L shoulder worsening pain since injury back in Sept; Pt also with opposite R elbow pain with signs of lateral epicondylitis that has developed and worsened over the past few weeks, also with high levels of pain.  Emphasis on pain relief, and ROM as magda; pt is at risk for adhesive capsulitis.     Recommend ortho consult at this time; poor tolerance for gentle exercises due to pain. Long head bicep tendon highly irritable.     Goals:   (to be met in 10 visits)   (I) with HEP   Improved shoulder flex 160 deg for overhead  reach.    Improved strength to enable putting light dishes into cabinets with assist of L UE.    Able to sleep uninterrupted from shoulder pain.  Improved IR ROM to 70 for easier dressing.  Improved ER to 90 for easier hair care.  Able to put on coat/shirt without pain or difficulty.    Plan: Recommend referral to ortho.    Cont with light HEP as magda, pain management principles  Date: 5/27/2024  TX#: 2/10 Date: 5/30/24                TX#: 3/5 approved Date:                 TX#: 4/ Date:                 TX#: 5/ Date:   Tx#: 6/   TE 38'  UBE 2.5' F 2.5' B TE  UBE L only 4'      Pulleys flex 10x Scap sets 10x  Wall towel slide 10x      Pendulum dangle/circles 20x each  Wall slide with opp UE assist 5x Pendulum circles 10x2  Supine AA flex 5x  Gentle forearm stretch bilat 10\" holds        AAROM supine flex hands clasped 5x  AA elbow flex/ext bilat  Low level forearm stretch instructed - posture breaks at desk  Scap sets 10x --> progress to low doorway stretch as magda.   HEP review  Pt ed pain relief, positioning, regular icing, pillow support, avoiding impinging positions.     Manual 5'  Attempted PROM-limited /pain  Gentle cross friction long head biceps     Considered tape/no,due to skin sensitivity hx  Pain  management techniques review ie icing, support, activity mod. Trial wrist splint for pain relief.       HEP:   Access Code: [email or web address]  URL: [email or web address]  Date: 05/21/2024  Prepared by: Yanci Wayne     Program Notes  Remember your good up tall posture. Shoulder blades slightly \"down and in\"Use good pillow support at night to support the armIce after the exercises 10-15 mins. You may ice 2-3x/day to help with pain.      Exercises  - Seated Scapular Retraction  - 2 x daily - 7 x weekly - 1 sets - 10 reps - 3 hold  - Standing Shoulder Flexion Wall Walk (or towel slide)  - 2 x daily - 7 x weekly - 1-2 sets - 10 reps  - Supine Shoulder Flexion AAROM with Hands Clasped  - 2 x daily - 7 x  weekly - 1-2 sets - 10 reps  - Passive Arm Dangle - Gentle Pendulum  - 3 x daily - 7 x weekly - 3 sets - 10 reps    Charges: TEx3       Total Timed Treatment: 40 min  Total Treatment Time: 40 min

## 2024-05-30 NOTE — TELEPHONE ENCOUNTER
PSR - Please clarify. Does the pt want orders for occupational therapy since she is already doing physical therapy ?

## 2024-05-30 NOTE — TELEPHONE ENCOUNTER
Patient called stating she has been going to physical therapy and they have said that the patient has tennis elbow. Patient asking if physical therapy can be added to her current orders. Her therapist said she can do both in same visit. Please advise      Patient also states she has a bump, about the size of a peanut M&M, under the skin on the interior side of her right arm behind her elbow. Doesn't know when it got there or if it is effecting the tennis elbow.

## 2024-05-31 NOTE — TELEPHONE ENCOUNTER
Patient is wanting physical therapy for the elbow added. She is going to physical therapy for her shoulder.

## 2024-06-01 NOTE — TELEPHONE ENCOUNTER
LOV 4/17/24     Dr. Bautista - Pt is currently doing PT for her shoulder and is wanting order for PT for her elbow. Ok to place additional PT with dx code elbow pain?

## 2024-06-04 NOTE — TELEPHONE ENCOUNTER
Patient returned call, advised of order for elbow xray and referral to ortho.  Phone number provided.  She confirms understanding.

## 2024-06-04 NOTE — TELEPHONE ENCOUNTER
Noted PT notes and pt with poor tolerance to gentle exercises due to pain.  Symptoms primarily in left shoulder and right elbow.  Will refer to orthopedic specialist prior to further PT orders  Referral entered.  Please notify patient to schedule appointment.  Also entered order for R elbow xray which pt should get done prior to Ortho appt.

## 2024-06-07 ENCOUNTER — HOSPITAL ENCOUNTER (OUTPATIENT)
Dept: GENERAL RADIOLOGY | Age: 49
Discharge: HOME | End: 2024-06-07
Attending: FAMILY MEDICINE
Payer: COMMERCIAL

## 2024-06-07 ENCOUNTER — TELEPHONE (OUTPATIENT)
Dept: ORTHOPEDICS CLINIC | Facility: CLINIC | Age: 49
End: 2024-06-07

## 2024-06-07 DIAGNOSIS — M25.521 CHRONIC PAIN OF RIGHT ELBOW: ICD-10-CM

## 2024-06-07 DIAGNOSIS — G89.29 CHRONIC PAIN OF RIGHT ELBOW: ICD-10-CM

## 2024-06-07 PROCEDURE — 73080 X-RAY EXAM OF ELBOW: CPT | Performed by: FAMILY MEDICINE

## 2024-06-07 NOTE — TELEPHONE ENCOUNTER
Patient called for left shoulder pain. Please advise if additional xrays needed   Future Appointments   Date Time Provider Department Center   6/7/2024  8:05 PM PF XR RM1 PF XRAY [place]   6/20/2024  5:30 PM Yanci Wayne, PT SNPT [facility]   6/25/2024  5:30 PM Yanci Wayne R, PT SNPT [facility]   6/27/2024  3:00 PM Yash Lozano, DO [facility]   6/27/2024  5:30 PM Yanci Wayne, PT SNPT [facility]   7/2/2024  5:30 PM Yanci Wayne, PT SNPT [facility]   7/9/2024  5:30 PM Yanci Wayne R, PT [facility]   7/11/2024  5:30 PM Yanci Wayne R, PT [facility]   7/16/2024  5:30 PM Yanci Wayne R, PT [facility]   7/18/2024  5:30 PM Yanci Wayne R, PT SNPT [facility]

## 2024-06-20 ENCOUNTER — APPOINTMENT (OUTPATIENT)
Dept: PHYSICAL THERAPY | Age: 49
End: 2024-06-20
Attending: FAMILY MEDICINE
Payer: COMMERCIAL

## 2024-06-25 ENCOUNTER — APPOINTMENT (OUTPATIENT)
Dept: PHYSICAL THERAPY | Age: 49
End: 2024-06-25
Attending: FAMILY MEDICINE
Payer: COMMERCIAL

## 2024-06-27 ENCOUNTER — OFFICE VISIT (OUTPATIENT)
Dept: ORTHOPEDICS CLINIC | Facility: CLINIC | Age: 49
End: 2024-06-27

## 2024-06-27 ENCOUNTER — APPOINTMENT (OUTPATIENT)
Dept: PHYSICAL THERAPY | Age: 49
End: 2024-06-27
Attending: FAMILY MEDICINE
Payer: COMMERCIAL

## 2024-06-27 VITALS — WEIGHT: 180 LBS | BODY MASS INDEX: 31.89 KG/M2 | HEIGHT: 63 IN

## 2024-06-27 DIAGNOSIS — M77.11 RIGHT LATERAL EPICONDYLITIS: ICD-10-CM

## 2024-06-27 DIAGNOSIS — M75.102 ROTATOR CUFF SYNDROME OF LEFT SHOULDER: Primary | ICD-10-CM

## 2024-06-27 DIAGNOSIS — M24.9 DERANGEMENT OF LEFT SHOULDER JOINT: ICD-10-CM

## 2024-06-27 PROCEDURE — 3008F BODY MASS INDEX DOCD: CPT | Performed by: FAMILY MEDICINE

## 2024-06-27 PROCEDURE — 99204 OFFICE O/P NEW MOD 45 MIN: CPT | Performed by: FAMILY MEDICINE

## 2024-06-27 RX ORDER — DICLOFENAC SODIUM 75 MG/1
75 TABLET, DELAYED RELEASE ORAL 2 TIMES DAILY
Qty: 28 TABLET | Refills: 1 | Status: SHIPPED | OUTPATIENT
Start: 2024-06-27

## 2024-06-27 NOTE — H&P
Sports Medicine Clinic Note     Subjective:    Chief Complaint   Patient presents with    Shoulder Pain     Left shoulder pain radiating across chest and at times down to wrist;   ONSET: left shoulder pain September of 2023 after moving furniture   Pain Score: 9 @peak, current 7     Elbow Pain     Right Elbow pain;   Radiates to wrist and has a \"knot\"  ONSET: several months ago   Pain Score: 9-10     History of Present Illness: The patient is a 48-year-old right hand dominant female presenting with chronic pain in the left shoulder and right elbow. The left shoulder pain began after moving furniture in September 2023, with pain radiating to the chest and wrist. Patient states PCP has ruled out cardiac cause of the chest component of pain thought to be more musculoskeletal. The right elbow pain, described as radiating to the wrist with a noticeable \"knot\" at the lateral epicondyle began several months ago. The patient has been undergoing physical therapy for the left shoulder focusing on rotator cuff and periscapular muscles, but compliance has been challenging due to significant pain. The patient has not started formal physical therapy for the elbow. No history of acute injury reported for the elbow.    Objective:    Left Shoulder Examination:    Inspection:  - No obvious deformity or swelling  - Mild discomfort noted on arm movement    Palpation:  - Tenderness over the rotator cuff insertions, especially supraspinatus  - Tenderness over the acromioclavicular joint    Range of Motion:  - Limited active range of motion in all planes due to pain  - Pain exacerbated with abduction and external rotation  - No hard endpoint with PROM able to achieve  / Abd 170 / ER 50 / IR lumbar spine     Neurovascular:  - Intact    Special Tests:  - Positive Butt-Lyndon impingement sign  - Positive Neer’s test  - Pain with resisted abduction  - No drop arm or lag signs    Right Elbow Examination:    Inspection:  - No visible  swelling or erythema    Palpation:  - Tenderness along the extensor tendon origin    Range of Motion:  - Full range of motion with discomfort on extension and supination    Neurovascular:  - Sensation intact to light touch  - Radial and ulnar pulses palpable and strong    Special Tests:  - Positive Cozen’s test  - Positive Mill’s test    Diagnostic Tests:    Right Elbow X-ray:   - No abnormalities demonstrated. Joint spaces are normal with no evidence of fracture, expansile lesion, erosion, osteophytes, or abnormal calcifications. Soft tissue contours appear normal.    Left Shoulder X-ray:  - No evidence of acute displaced fracture or dislocation. Normal mineralization and unremarkable soft tissues. Mild osteoarthritic changes in the acromioclavicular joint.    Assessment:    Suspected rotator cuff syndrome vs other shoulder derangement of the left shoulder, failed conservative management  Right lateral epicondylitis    Plan:    Additional Imaging/Workup:  - Order MRI of the left shoulder to further evaluate the rotator cuff and other internal structures due to chronicity of symptoms and failure of conservative management.    Therapy:  - Initiate physical therapy for the right elbow focusing on eccentric strengthening exercises for the extensor muscles.  - Continue physical therapy for the left shoulder with modifications to improve compliance and pain management.    Medications:  - NSAIDs for pain management. The patient has primarily been using over-the-counter medications and will try prescribed NSAIDs.    Procedures:  - None at this time. The patient has deferred injection therapy or any other procedural intervention until after the MRI which is reasonable.    Activity Recommendations:  - Limit activities that exacerbate pain. Encourage gentle range of motion exercises and gradual increase in activity as tolerated.    Follow-Up:  - Follow-up appointment scheduled after shoulder MRI results are available to  reassess the treatment plan. The patient is advised to initiate right elbow physical therapy in the interim and to return if symptoms worsen or new symptoms develop.      Yash Lozano DO, ERNESTOM   Primary Care Sports Medicine    Department of Orthopaedic Surgery  [facility]    [address] [place] [address]  [address] [place] [address]    t: [phone]  f: [phone]      [facility]

## 2024-06-28 ENCOUNTER — HOSPITAL ENCOUNTER (OUTPATIENT)
Dept: MRI IMAGING | Facility: HOSPITAL | Age: 49
Discharge: HOME | End: 2024-06-28
Attending: FAMILY MEDICINE
Payer: COMMERCIAL

## 2024-06-28 DIAGNOSIS — M24.9 DERANGEMENT OF LEFT SHOULDER JOINT: ICD-10-CM

## 2024-06-28 DIAGNOSIS — M75.102 ROTATOR CUFF SYNDROME OF LEFT SHOULDER: ICD-10-CM

## 2024-06-28 PROCEDURE — 73221 MRI JOINT UPR EXTREM W/O DYE: CPT | Performed by: FAMILY MEDICINE

## 2024-07-02 ENCOUNTER — APPOINTMENT (OUTPATIENT)
Dept: PHYSICAL THERAPY | Age: 49
End: 2024-07-02
Attending: FAMILY MEDICINE
Payer: COMMERCIAL

## 2024-07-02 ENCOUNTER — TELEPHONE (OUTPATIENT)
Dept: PHYSICAL THERAPY | Facility: HOSPITAL | Age: 49
End: 2024-07-02

## 2024-07-02 ENCOUNTER — OFFICE VISIT (OUTPATIENT)
Dept: PHYSICAL THERAPY | Age: 49
End: 2024-07-02
Attending: FAMILY MEDICINE
Payer: COMMERCIAL

## 2024-07-02 ENCOUNTER — TELEPHONE (OUTPATIENT)
Dept: ORTHOPEDICS CLINIC | Facility: CLINIC | Age: 49
End: 2024-07-02

## 2024-07-02 DIAGNOSIS — M77.11 RIGHT LATERAL EPICONDYLITIS: Primary | ICD-10-CM

## 2024-07-02 PROCEDURE — 97140 MANUAL THERAPY 1/> REGIONS: CPT

## 2024-07-02 PROCEDURE — 97162 PT EVAL MOD COMPLEX 30 MIN: CPT

## 2024-07-02 PROCEDURE — 97110 THERAPEUTIC EXERCISES: CPT

## 2024-07-02 NOTE — TELEPHONE ENCOUNTER
Pt currently scheduled for 07.11 to review MRI results however was wondering if an RN would be able to call her in the meantime to tell her what the results actually are.    Pt can be reched at [phone]

## 2024-07-02 NOTE — PROGRESS NOTES
SHOULDER EVALUATION:     Diagnosis:   Right lateral epicondylitis (M77.11)     Comments:  Recommend initial modalities such as cold packs and ultrasound for the initial reduction of inflammation and pain. Following this, range of motion and stretching exercises targeting the wrist extensors should be implemented. Strengthening exercises should also be gradually introduced, focusing on  and wrist extension with resistance bands or light dumbbells. Manual therapy techniques like soft-tissue mobilization may be beneficial. Adjunctive treatments like Kinesio taping can also be used to support the affected area. Concurrently, activity modification techniques should be educated to prevent exacerbation of symptoms during daily activities or sports. The frequency of therapy may start with 2-3 times a week, tapering off as symptoms improve.\ Referring Provider: Yash Lozano  Date of Evaluation:    7/2/2024    Precautions:  None   Next MD visit:   After PT  Pt also following up for L shoulder dx.with ortho  Date of Surgery: n/a    HMO 5 authorized.      PATIENT SUMMARY   Td Quintero is a 48 year old female who presents to therapy today with complaints of worsening R elbow pain for the past few months.  No known injury.  Does have hx of L shoulder injury Sept last year; awaiting MRI results currently. This makes it difficult to rest either UE due to pain on both.  Has followed up with PCP and ortho.  No relief thus far with meds, brace or wrist splint. Pt reports she does not like taking medication and stopped the diclofenac \"it didn't help anyway\"No c/o N/T.  Has tried icing but also been very busy, out of town plans to resume now.   Not dropping things.  \"Hurts to even touch it\"  Pt describes pain level current 8/10, at best 3/10, at worst 10/10.   Current functional limitations include pain/difficulty with any movement R arm, reaching/lifting anything, typing. Pain aggravated today after folding laundry.   Driving is painful.     Td describes prior level of function independent. Pt goals include get rid of the pain.  Past medical history was reviewed with Td. Significant findings include L shoulder injury sept 2023.  has a past medical history of Acute maxillary sinusitis, Acute, but ill-defined, cerebrovascular disease, Allergic rhinitis, Anemia, Costochondritis, Dysphagia, unspecified(787.20), History of abnormal Pap smear (1990's), Leukocytopenia, unspecified, Lump or mass in breast, Migraine, Migraines, Nontoxic multinodular goiter, Osteoarthrosis, unspecified whether generalized or localized, unspecified site, Other chest pain, Prediabetes, TIA (transient ischemic attack), Unspecified disorder of thyroid (09/2014), and Visual impairment.      ASSESSMENT  Td presents to physical therapy evaluation with primary c/o R elbow pain for the past few months. The results of the objective tests and measures show severe tenderness R lateral epicondyle and surrounding musculature/wrist extensors. ROM is guarded and painful.  Strength testing pain with resistance testing multiple planes; will cont to assess. Focus today on pain mangagement.  Functional deficits include but are not limited to pain/difficulty with any movement R arm, reaching/lifting anything, typing. Pain aggravated today after folding laundry.  Driving is painful. . Pt also with L shoulder pain from injury/awaiting MRI results but this makes it difficult to rest either arm.   Signs and symptoms are consistent with diagnosis of lateral epicondylitis. Pt and PT discussed evaluation findings, pathology, POC and HEP.  Pt voiced understanding and performs HEP correctly without reported pain. Skilled Physical Therapy is medically necessary to address the above impairments and reach functional goals.     OBJECTIVE:   Observation/Posture: forward shoulders  Palpation: (+) R lateral epicondyle into wrist extensors  Sensation: intact to light touch  UE's  Cervical Screen: (-) quadrant    AROM: (* denotes performed with pain)  Elbow   Flexion: R 140*pain;   Extension: R -10 *deg*pain;   Supination: R 80*, L 80  Pronation: R 70*, L 70     Accessory motion: TBA  Flexibility: tightness pects    Strength/MMT: limited by pain; will cont to assess.      Special tests:   (+) wrist extensors  (-) wrist flexors  (-) cervical quadrant  Today’s Treatment and Response:   Pt education was provided on exam findings, treatment diagnosis, treatment plan, expectations, and prognosis. Pt was also provided recommendations for activity modifications, modalities as needed [ice/heat], postural corrections, and pain science education .  STM light wrist extensors, desensitization.   Pt ed use of ice, activity modification as below,   Patient was instructed in and issued a HEP for:   Access Code: [email or web address]  URL: [email or web address]  Date: 07/02/2024  Prepared by: Yanci Laboy Notes  As you heal, avoid aggravating activities: AVOID heavy gripping and repetitive wrist movements.AVOID working with elbow straight, palms down and wrist angled up.Alternate arms or take frequent breaks.When carrying, keep elbows bent, objects close to side.Ice. Try Counterforce forearm brace or wrist splint    Exercises  - Seated Wrist Flexion Stretch  - 2 x daily - 7 x weekly - 1 sets - 3 reps - 30 hold  - Seated Cervical Sidebending AROM  - 2 x daily - 7 x weekly - 1 sets - 3 reps - 10 sec hold    Patient Education  - Tennis Elbow    Charges: PT Eval Moderate Complexity, TE x1 12' man x 1 8'    Total Timed Treatment: 20 min     Total Treatment Time: 45 min     Based on the clinical presentation, examination and history, this evaluation shows involvement of 3-4 body structures involved / activity limitations, 1-2 personal factors / comorbidities and the condition is evolving due to changing clinical characteristics indicating moderate complexity.     PLAN OF CARE:    Goals:  (to be met in 8 visits)   (I) with HEP   Decreased tenderness R epicondyle to enable painfree resting UE on surfaces, sleep comfort  Able to use R UE for household tasks ie laundry, dinner without aggravation of symptoms  At least 75% improvement in symptoms with UE functional tasks, light lifting, carrying.       Frequency / Duration: Patient will be seen for 2 x/week or a total of 8 visits over a 90 day period. Treatment will include: Manual Therapy, Neuromuscular Re-education, Therapeutic Activities, Therapeutic Exercise, Home Exercise Program instruction, and ice, U/S and taping as indicated. Possible counterforce brace and/or wrist splint to facilitate rest/pain relief     Education or treatment limitation: None  Rehab Potential:good    QuickDASH Outcome Score  Score: 43.18 % (5/21/2024  5:35 PM)      Patient/Family/Caregiver was advised of these findings, precautions, and treatment options and has agreed to actively participate in planning and for this course of care.    Thank you for your referral. Please co-sign or sign and return this letter via fax as soon as possible to [phone]. If you have any questions, please contact me at Dept: [phone]    Sincerely,  Electronically signed by therapist: Yanci Wayne, PT  Physician's certification required: Yes  I certify the need for these services furnished under this plan of treatment and while under my care.    X___________________________________________________ Date____________________    Certification From: 7/2/2024  To:9/30/2024

## 2024-07-02 NOTE — TELEPHONE ENCOUNTER
Dr Lozano reviewed the MRI.  Spoke with pt to advise that I can tell her basic suspected diagnosis but otherwise would need to see Dr lozano for indepth discussion of results and to discuss next steps. Recommended in meantime to avoid lifting overhead (ie high shelf) and motions that worsen pain.  Pt does not want MyChart to review full report.

## 2024-07-09 ENCOUNTER — APPOINTMENT (OUTPATIENT)
Dept: PHYSICAL THERAPY | Age: 49
End: 2024-07-09
Attending: FAMILY MEDICINE
Payer: COMMERCIAL

## 2024-07-11 ENCOUNTER — APPOINTMENT (OUTPATIENT)
Dept: PHYSICAL THERAPY | Age: 49
End: 2024-07-11
Attending: FAMILY MEDICINE
Payer: COMMERCIAL

## 2024-07-11 ENCOUNTER — OFFICE VISIT (OUTPATIENT)
Dept: ORTHOPEDICS CLINIC | Facility: CLINIC | Age: 49
End: 2024-07-11
Payer: COMMERCIAL

## 2024-07-11 VITALS — WEIGHT: 180 LBS | HEIGHT: 63 IN | BODY MASS INDEX: 31.89 KG/M2

## 2024-07-11 DIAGNOSIS — M19.019 AC JOINT ARTHROPATHY: ICD-10-CM

## 2024-07-11 DIAGNOSIS — M77.11 RIGHT LATERAL EPICONDYLITIS: ICD-10-CM

## 2024-07-11 DIAGNOSIS — M75.102 ROTATOR CUFF SYNDROME OF LEFT SHOULDER: Primary | ICD-10-CM

## 2024-07-11 PROCEDURE — 99214 OFFICE O/P EST MOD 30 MIN: CPT | Performed by: FAMILY MEDICINE

## 2024-07-11 PROCEDURE — 3008F BODY MASS INDEX DOCD: CPT | Performed by: FAMILY MEDICINE

## 2024-07-11 RX ORDER — MELOXICAM 15 MG/1
15 TABLET ORAL DAILY
Qty: 30 TABLET | Refills: 0 | Status: SHIPPED | OUTPATIENT
Start: 2024-07-11

## 2024-07-11 RX ORDER — CYCLOBENZAPRINE HCL 10 MG
10 TABLET ORAL NIGHTLY
Qty: 20 TABLET | Refills: 0 | Status: SHIPPED | OUTPATIENT
Start: 2024-07-11 | End: 2024-07-31

## 2024-07-11 NOTE — PROGRESS NOTES
Sports Medicine Clinic Note     Subjective:    Chief Complaint   Patient presents with    Follow - Up     Follow up left shoulder MRI result;  Worse or better - same, medication not helping with the pain      Interval History: The patient is a 48-year-old right-hand dominant female who presents for a follow-up visit to discuss MRI findings of the left shoulder. Since the last visit, the patient continues to experience chronic left shoulder pain, which has limited her range of motion and activities. She has also reported ongoing pain in the right elbow, particularly at the lateral epicondyle. The patient has been more compliant with physical therapy but still finds it difficult due to significant pain in the shoulder. She has also initiated physical therapy for the elbow as previously recommended.    Objective:    Left Shoulder Examination:    Inspection:  - No obvious deformity or swelling  - Mild discomfort noted on arm movement    Palpation:  - Tenderness over the rotator cuff insertions, especially supraspinatus  - Tenderness over the acromioclavicular joint    Range of Motion:  - Limited active range of motion in all planes due to pain  - Pain exacerbated with abduction and external rotation  - No hard endpoint with PROM able to achieve  / Abd 170 / ER 50 / IR lumbar spine    Neurovascular:  - Intact    Diagnostic Tests:    MRI Left Shoulder:  - Rotator cuff tendons intact with mild tendinosis of the anterior to mid supraspinatus tendon. No high-grade tendinopathy or rotator cuff tear.  - Rotator cuff enthesopathy with a 9 mm subcortical cyst along the anterior distal supraspinatus insertion on the greater tuberosity.  - Mild to moderate AC joint arthropathy with hypertrophy and capsulitis. Type 2 acromion. Subacromial enthesopathy and narrowing of the subacromial arch with minimal subacromial/subdeltoid bursitis.  - Findings suspicious for extrinsic rotator cuff impingement.  - Intact biceps tendon and  glenoid labrum without significant tendinosis or slap tear.  - No significant arthritis or acute injury in the glenohumeral joint. Normal marrow and cortical signal. No effusion.    Assessment:    1. Extrinsic rotator cuff impingement of the left shoulder with mild supraspinatus tendinopathy and AC arthropathy  2. Right lateral epicondylitis.    Plan:    Additional Imaging/Workup:  - No additional imaging required at this time.     Therapy:  - Continue physical therapy for the left shoulder, focusing on rotator cuff and periscapular muscles. Modify exercises to improve compliance and manage pain.  - Continue physical therapy for the right elbow, focusing on eccentric strengthening exercises for the extensor muscles.    Medications:  - Prescribe Meloxicam for pain management as the patient prefers to try a different NSAID.  - Consider prescribing a muscle relaxer to help with pain management and improve therapy compliance.    Procedures:  - The patient prefers to defer injections or surgical consult at this time.    Activity Recommendations:  - Advise the patient to avoid activities that exacerbate pain. Encourage gentle range of motion exercises for the shoulder and gradual increase in activity as tolerated.  - Continue to limit activities that strain the elbow and encourage adherence to physical therapy exercises.    Follow-Up:  - Follow-up in 4-6 weeks to reassess the treatment plan and adapt as necessary. The patient is advised to return earlier if symptoms worsen or new symptoms develop.      Yash Lozano DO, CAQSM   Primary Care Sports Medicine    Department of Orthopaedic Surgery  [facility]    [address] [place] [address]  [address] [place] [address]    t: [phone]  f: [phone]      [facility]

## 2024-07-16 ENCOUNTER — OFFICE VISIT (OUTPATIENT)
Dept: PHYSICAL THERAPY | Age: 49
End: 2024-07-16
Attending: FAMILY MEDICINE
Payer: COMMERCIAL

## 2024-07-16 PROCEDURE — 97035 APP MDLTY 1+ULTRASOUND EA 15: CPT

## 2024-07-16 PROCEDURE — 97112 NEUROMUSCULAR REEDUCATION: CPT

## 2024-07-16 PROCEDURE — 97110 THERAPEUTIC EXERCISES: CPT

## 2024-07-16 NOTE — PROGRESS NOTES
Diagnosis:   Right lateral epicondylitis (M77.11)    Comments: Recommend initial modalities such as cold packs and ultrasound for the initial reduction of inflammation and pain. Following this, range of motion and stretching exercises targeting the wrist extensors should be implemented. Strengthening exercises should also be gradually introduced, focusing on  and wrist extension with resistance bands or light dumbbells. Manual therapy techniques like soft-tissue mobilization may be beneficial. Adjunctive treatments like Kinesio taping can also be used to support the affected area. Concurrently, activity modification techniques should be educated to prevent exacerbation of symptoms during daily activities or sports. The frequency of therapy may start with 2-3 times a week, tapering off as symptoms improve.\    Referring Provider: Yash Lozano Date of Evaluation:    7/2/24 elbow    Precautions:  None Next MD visit:   After PT  Date of Surgery: n/a   Insurance Primary/Secondary: [employer] / N/A     # Auth Visits: 10            Subjective: Reports has followed up with ortho, discussed MRI results and to cont with the therapy for the elbow and shoulder.  Changed to a different anti inflammatory medication; pt reports lots of concern over side effects and prefers not to take meds.  Would like to avoid injection if possible.  Using elbow support at times.  Pain: 4-5/10 R elbow  7-8/10  L shoulder  Eval: elbow 8/10, at best 3/10, at worst 10/10)      Objective:   Elbow ext -5 deg with pain  L shoulder flex 120 deg with pain  ER 50 deg with pain limiting  (+) tenderness R lat epicondyle into wrist extensors  (+) tenderness L long head biceps, RC insertions    Assessment: Pt with high levels of pain R lateral epicondyle area and L shoulder.  Pain has decreased somewhat since last visit. Emphasis on pain relief with gentle exercises painfree range, cues to avoid overdoing it. Also education on placement of  counterforce brace; decreased pain wrist extension with wear today.  Initiated U/S and light STM/stretches.  Recommended more regular icing at home.   Trial taping next visit; pt with skin sensitivities but would like to try the taping. Reports she has no reaction to bandaids but did to EKG leads.  Provided small piece kinseotape to test small area of skin for possible taping next visit. Pt was instructed regarding guidelines and precautions of tape, including length of wear and removal.  However, also discussed tape is to be removed sooner if pt suspects skin irritation or increased pain.      Goals:    (to be met in 8 visits)   (I) with HEP   Decreased tenderness R epicondyle to enable painfree resting UE on surfaces, sleep comfort  Able to use R UE for household tasks ie laundry, dinner without aggravation of symptoms  At least 75% improvement in symptoms with UE functional tasks, light lifting, carrying.      Plan: Patient will be seen for 1-2 x/week or a total of 10 visits over a 90 day period. Treatment will include: Manual Therapy, Neuromuscular Re-education, Therapeutic Activities, Therapeutic Exercise, and Home Exercise Program instruction  Date: 7/16/2024  TX#: 2/5 Date:                 TX#: 3/ Date:                 TX#: 4/ Date:                 TX#: 5/ Date:   Tx#: 6/   TE  Reassess  Pendulum painfree range  Towel bilat wall slide 10x  Scap sets 10x  HEP review  Trial tape/skin test       U/S R wrist extensors 7' 1.4 wts/cm2        Followed by light STM and gentle stretch  NR  Pt ed review use of ice, activity modification, lifting/carrying techniques. Ie avoid heavy gripping/twisting  -pillow/towel roll for pain relief   -ergo considerations                HEP:   Access Code: [email or web address]  URL: [email or web address]  Date: 07/02/2024  Prepared by: Yanci Laboy Notes  As you heal, avoid aggravating activities: AVOID heavy gripping and repetitive wrist movements.AVOID working  with elbow straight, palms down and wrist angled up.Alternate arms or take frequent breaks.When carrying, keep elbows bent, objects close to side.Ice. Try Counterforce forearm brace or wrist splint     Exercises  - Seated Wrist Flexion Stretch  - 2 x daily - 7 x weekly - 1 sets - 3 reps - 30 hold  - Seated Cervical Sidebending AROM  - 2 x daily - 7 x weekly - 1 sets - 3 reps - 10 sec hold     Patient Education  - Tennis Elbow    Charges: U/S TEx2 35  NR x1 10'       Total Timed Treatment: 45 min + U/S Total Treatment Time: 55 min

## 2024-07-18 ENCOUNTER — APPOINTMENT (OUTPATIENT)
Dept: PHYSICAL THERAPY | Age: 49
End: 2024-07-18
Attending: FAMILY MEDICINE
Payer: COMMERCIAL

## 2024-07-19 DIAGNOSIS — R21 FACIAL RASH: ICD-10-CM

## 2024-07-19 NOTE — TELEPHONE ENCOUNTER
Received transfer call. Pt stated she is looking for refill of cream. Using daily for Rosacea. Pt stated she is out of cream and wants before weekend. Advised for future to keep in mind refills can take 72 hours. Pt aware for future.     LOV 4/17/24     10. Facial rash  Regular sunscreen use.  - metRONIDAZOLE (METROCREAM) 0.75 % External Cream; Apply to aa bid as directed  Dispense: 45 g; Refill: 0  - Connective Tissue Disease (JUJU) Screen [E]; Future

## 2024-07-19 NOTE — TELEPHONE ENCOUNTER
Left message to call back 7/19, HIPAA states to not leave message.     PSRs- please inform pt refill has been sent upon call back.

## 2024-07-24 DIAGNOSIS — E89.0 POSTSURGICAL HYPOTHYROIDISM: ICD-10-CM

## 2024-07-27 RX ORDER — LEVOTHYROXINE SODIUM 88 UG/1
88 TABLET ORAL
Qty: 90 TABLET | Refills: 3 | Status: SHIPPED | OUTPATIENT
Start: 2024-07-27

## 2024-07-27 NOTE — TELEPHONE ENCOUNTER
Refill Passed per Protocol.     Requested Prescriptions   Pending Prescriptions Disp Refills    LEVOTHYROXINE 88 MCG Oral Tab [Pharmacy Med Name: LEVOTHYROXINE 0.088MG (88MCG) TAB] 30 tablet 0     Sig: TAKE 1 TABLET(88 MCG) BY MOUTH BEFORE BREAKFAST       Thyroid Medication Protocol Passed - 7/24/2024  8:11 AM        Passed - TSH in past 12 months        Passed - Last TSH value is normal     Lab Results   Component Value Date    TSH 1.520 04/11/2024                 Passed - In person appointment or virtual visit in the past 12 mos or appointment in next 3 mos     Recent Out[facility]its              1 week ago     [facility]    Office Visit    2 weeks ago Rotator cuff syndrome of left shoulder    [facility], [address] [place], [place] [address]Yash Martinez, DO    Office Visit    3 weeks ago Right lateral epicondylitis    [facility]    Office Visit    1 month ago Rotator cuff syndrome of left shoulder    [facility], [address] [place], [place] [address]Yash Martinez, DO    Office Visit    1 month ago     [facility]    [facility]                              Recent Outpatient Visits              1 week ago     [facility]    Office Visit    2 weeks ago Rotator cuff syndrome of left shoulder    [facility], [address] [place], [place] [address]Yash Martinez, DO    Office Visit    3 weeks ago Right lateral epicondylitis    [facility]    Office Visit    1 month ago Rotator cuff syndrome of left shoulder    [facility], [address] [place], [place] [address]Yash Martinez, DO    Office Visit    1 month ago     [facility]    Office Visit             22-Jan-2019 20:49

## 2024-08-06 ENCOUNTER — TELEPHONE (OUTPATIENT)
Dept: PHYSICAL THERAPY | Age: 49
End: 2024-08-06

## 2024-08-07 NOTE — TELEPHONE ENCOUNTER
Called pt to follow up. Pt canceled appts for now, she wanted to work on the exercises on her own for now and see if the medication was helpful.    She would like to wait a few more weeks and then resume PT.

## 2024-09-24 DIAGNOSIS — R21 FACIAL RASH: ICD-10-CM

## 2024-09-24 NOTE — TELEPHONE ENCOUNTER
Patient calling stating pharmacy has been waiting for response on refill(see none in chart on our end).     metRONIDAZOLE 0.75 % External Cream     [facility] #[id number] [place] [id number] AT [facility] [address], [phone], [phone]       Please advise

## 2024-09-27 NOTE — TELEPHONE ENCOUNTER
Please advise on refill request.     Requested Prescriptions     Pending Prescriptions Disp Refills    metRONIDAZOLE 0.75 % External Cream 45 g 0     Sig: APPLY TOPICALLY TO THE AFFECTED AREA TWICE DAILY AS DIRECTED     Recent Outpatient Visits              2 months ago     [facility] Yanci Wayne, PT    Office Visit    2 months ago Rotator cuff syndrome of left shoulder    [facility], [address] [place] [address] Yash Lozano,     Office Visit    2 months ago Right lateral epicondylitis    [facility] Yanci Wayne, PT    Office Visit    3 months ago Rotator cuff syndrome of left shoulder    [facility], [address] [place] [address] Yash Lozano,     Office Visit    4 months ago     [facility] Yanci Wayne, PT    Office Visit

## 2024-09-27 NOTE — TELEPHONE ENCOUNTER
Pt was instructed to call a few days ahead of time before she was close to running out of medication. She is about to run out and checking on status/ please advise

## 2024-09-27 NOTE — TELEPHONE ENCOUNTER
Medication refilled.  Please notify patient  Also please schedule patient for medication follow-up office visit

## 2024-10-22 NOTE — TELEPHONE ENCOUNTER
Future Appointments   Date Time Provider Department Center   11/5/2024  6:20 PM Ti Bautista MD [facility]

## 2024-11-05 ENCOUNTER — OFFICE VISIT (OUTPATIENT)
Dept: FAMILY MEDICINE CLINIC | Facility: CLINIC | Age: 49
End: 2024-11-05
Payer: COMMERCIAL

## 2024-11-05 VITALS
TEMPERATURE: 97 F | WEIGHT: 183.31 LBS | SYSTOLIC BLOOD PRESSURE: 110 MMHG | HEIGHT: 63 IN | OXYGEN SATURATION: 97 % | HEART RATE: 104 BPM | BODY MASS INDEX: 32.48 KG/M2 | DIASTOLIC BLOOD PRESSURE: 72 MMHG | RESPIRATION RATE: 16 BRPM

## 2024-11-05 DIAGNOSIS — E53.8 FOLIC ACID DEFICIENCY: ICD-10-CM

## 2024-11-05 DIAGNOSIS — Z12.31 VISIT FOR SCREENING MAMMOGRAM: ICD-10-CM

## 2024-11-05 DIAGNOSIS — M77.11 RIGHT LATERAL EPICONDYLITIS: ICD-10-CM

## 2024-11-05 DIAGNOSIS — E89.0 POSTSURGICAL HYPOTHYROIDISM: Primary | ICD-10-CM

## 2024-11-05 DIAGNOSIS — G43.009 MIGRAINE WITHOUT AURA AND WITHOUT STATUS MIGRAINOSUS, NOT INTRACTABLE: ICD-10-CM

## 2024-11-05 DIAGNOSIS — M25.512 CHRONIC LEFT SHOULDER PAIN: ICD-10-CM

## 2024-11-05 DIAGNOSIS — E78.49 OTHER HYPERLIPIDEMIA: ICD-10-CM

## 2024-11-05 DIAGNOSIS — R07.89 MUSCULOSKELETAL CHEST PAIN: ICD-10-CM

## 2024-11-05 DIAGNOSIS — R73.03 PREDIABETES: ICD-10-CM

## 2024-11-05 DIAGNOSIS — L71.9 ROSACEA: ICD-10-CM

## 2024-11-05 DIAGNOSIS — G89.29 CHRONIC LEFT SHOULDER PAIN: ICD-10-CM

## 2024-11-05 DIAGNOSIS — E55.9 VITAMIN D DEFICIENCY: ICD-10-CM

## 2024-11-05 DIAGNOSIS — M75.42 ROTATOR CUFF IMPINGEMENT SYNDROME OF LEFT SHOULDER: ICD-10-CM

## 2024-11-05 DIAGNOSIS — D72.818 OTHER DECREASED WHITE BLOOD CELL (WBC) COUNT: ICD-10-CM

## 2024-11-05 DIAGNOSIS — Z51.81 ENCOUNTER FOR MEDICATION MONITORING: ICD-10-CM

## 2024-11-05 DIAGNOSIS — Z12.11 ENCOUNTER FOR SCREENING COLONOSCOPY: ICD-10-CM

## 2024-11-05 PROCEDURE — 3074F SYST BP LT 130 MM HG: CPT | Performed by: FAMILY MEDICINE

## 2024-11-05 PROCEDURE — 3078F DIAST BP <80 MM HG: CPT | Performed by: FAMILY MEDICINE

## 2024-11-05 PROCEDURE — 3008F BODY MASS INDEX DOCD: CPT | Performed by: FAMILY MEDICINE

## 2024-11-05 PROCEDURE — G2211 COMPLEX E/M VISIT ADD ON: HCPCS | Performed by: FAMILY MEDICINE

## 2024-11-05 PROCEDURE — 99214 OFFICE O/P EST MOD 30 MIN: CPT | Performed by: FAMILY MEDICINE

## 2024-11-05 RX ORDER — NAPROXEN 500 MG/1
TABLET ORAL
Qty: 30 TABLET | Refills: 1 | Status: SHIPPED | OUTPATIENT
Start: 2024-11-05

## 2024-11-05 RX ORDER — FOLIC ACID 1 MG/1
1 TABLET ORAL DAILY
Qty: 90 TABLET | Refills: 0 | Status: SHIPPED | OUTPATIENT
Start: 2024-11-05

## 2024-11-05 RX ORDER — METRONIDAZOLE 10 MG/G
GEL TOPICAL
Qty: 30 G | Refills: 1 | Status: SHIPPED | OUTPATIENT
Start: 2024-11-05

## 2024-11-05 RX ORDER — ERGOCALCIFEROL 1.25 MG/1
50000 CAPSULE, LIQUID FILLED ORAL WEEKLY
Qty: 12 CAPSULE | Refills: 0 | Status: SHIPPED | OUTPATIENT
Start: 2024-11-05

## 2024-11-06 NOTE — PROGRESS NOTES
Td Quintero is a 49 year old female.  HPI:  Pt is here for f/u on meds  Taking Levothyroxine daily  Some fatigue starts about 2 weeks before menses and then ok by time of menses  Menses regular, normal flow, lasts 3-5 days  No intermenstrual bleeding.  No pelvic pain  Eating healthy.   Walking regularly for exercise.   Some weight gain and difficulty with weight loss  H/o rosacea. Using Metronidazole cream bid  Feels like has extra skin around nose and nasal size has increased due to Rosacea  Did PT for R tennis elbow and L shoulder pain  Saw Ortho. Had MRI , L shoulder  Doing HEP  Used Capsacin patches to R elbow and L shoulder and helping  Tingling in R hand at times.  Decreased intensity of migraines but has chronic headaches, has a dull ache all the time. No sig change.   No vision changes.  Allergy to acetaminophen so cannot take Tylenol  Intermittent sinus headaches  Stopped folic acid after 2-3 weeks.  Gets muscular SEs with vitamins  Taking Allegra daily  Still having intermittent chest pain/soreness with diagnosis of costochondritis   Decreased vs previous but not resolved  No CP with activity.  No SOB or palpitations.     Current outpatient medication  Naproxen 500 mg 3 times daily/as needed  Multivitamin daily  Levothyroxine 88 mcg daily  Allegra 180 mg daily      Past Medical History:    Acute maxillary sinusitis    Acute, but ill-defined, cerebrovascular disease    Allergic rhinitis    Anemia    Costochondritis    Dysphagia, unspecified(787.20)    History of abnormal Pap smear    s/p cryo    Leukocytopenia, unspecified    Lump or mass in breast    Migraine    Migraines    Nontoxic multinodular goiter    s/p thyroid bx 2005 & 10/2008, both benign     Osteoarthrosis, unspecified whether generalized or localized, unspecified site    Right Knee, Spine    Other chest pain    Prediabetes    per pt.     TIA (transient ischemic attack)    Unspecified disorder of thyroid    MNG    Visual impairment     glasses as needed       Past Surgical History:   Procedure Laterality Date    Biopsy of thyroid,percut      benign    Bone marrow biopsy  207    benign    Hemorrhoidectomy  2013    Procedure: HEMORRHOIDECTOMY;  Surgeon: MALIK Allen MD;  Location: [facility] MAIN OR          Other surgical history      lap for ectopic after Delivery  with D&C    Other surgical history  10/2015    IR Cerebral Angiogram: No intracranial Aneurysm    Thyroidectomy  2020         Social History     Socioeconomic History    Marital status:    Tobacco Use    Smoking status: Never     Passive exposure: Never    Smokeless tobacco: Never   Vaping Use    Vaping status: Never Used   Substance and Sexual Activity    Alcohol use: Yes     Alcohol/week: 0.0 standard drinks of alcohol     Comment: rare    Drug use: No    Sexual activity: Yes     Partners: Male   Other Topics Concern    Caffeine Concern Yes     Comment: occasionally soda    Exercise Yes     Comment: walking    Seat Belt Yes                   REVIEW OF SYSTEMS:  GENERAL HEALTH: feels well otherwise, mood is good  SKIN: as above  RESPIRATORY: denies shortness of breath with exertion, no cough  CARDIOVASCULAR: denies chest pain on exertion  GI: denies abdominal pain and denies heartburn  : normal bladder  NEURO: denies headaches, denies dizziness    EXAM:  /72   Pulse 104   Temp 97.1 °F (36.2 °C) (Temporal)   Resp 16   Ht 5' 3\" (1.6 m)   Wt 183 lb 5 oz (83.2 kg)   LMP 2024 (Exact Date)   SpO2 97%   BMI 32.47 kg/m²   Wt Readings from Last 6 Encounters:   24 183 lb 5 oz (83.2 kg)   24 180 lb (81.6 kg)   24 180 lb (81.6 kg)   24 180 lb (81.6 kg)   24 180 lb (81.6 kg)   10/10/23 175 lb 2 oz (79.4 kg)     GENERAL: well developed, well nourished,in no apparent distress, pleasant affect  SKIN: erythema/rosacea change over nose and medial cheeks with some nasal prominence  HEENT: atraumatic, normocephalic,  Conj clear  NECK:  supple,no adenopathy,noTM  LUNGS: clear to auscultation  CARDIO: RRR without murmur  GI: NABS, soft, no tenderness, no masses, no HSM, ND  EXTREMITIES: no cyanosis, clubbing or edema  L shoulder with some anterolat tenderness.  Pain with abduction and IR.  Some left upper anterior chest wall tenderness.  Left lateral elbow tenderness.  Full range of motion.  No swelling.  N/V intact.  NEURO: A&O x3, no focal deficits  Normal gait    ASSESSMENT AND PLAN:    1. Postsurgical hypothyroidism  Continue current levothyroxine dose.  Monitor TFTs.  - TSH and Free T4; Future    2. Vitamin D deficiency  Will do another course of vitamin D prescription supplement weekly for 3 months.  Discussed importance of continuing with over-the-counter vitamin D3 supplement, 2000 units daily after this.  Monitor  - Vitamin D; Future    3. Folic acid deficiency  Recommend folic acid supplement 1 mg daily.  - Folic Acid Serum [E]; Future    4. Rosacea  Patient notes some flareup of her rosacea with some findings of rhinophyma.  MetroCream did not help.  Will try metronidazole 1% gel to affected area twice daily.  Regular sunscreen use.  Advise dermatology evaluation.  - DERM - INTERNAL    5. Prediabetes  Reviewed diet/exercise.  Monitor.  Last A1c from 4/2024: 6.3  - Comp Metabolic Panel (14); Future  - Hemoglobin A1C; Future    6. Other hyperlipidemia  Reviewed diet/exercise/lipid goals.  Monitor.  - Comp Metabolic Panel (14); Future  - Lipid Panel; Future    7. Other decreased white blood cell (WBC) count  Chronic.  Has had full workup in the past.  Clinically benign.  Monitor.  - CBC With Differential With Platelet; Future  - Comp Metabolic Panel (14); Future    8. Chronic left shoulder pain  9. Rotator cuff impingement syndrome of left shoulder  10. Musculoskeletal chest pain  Noted has seen orthopedic specialist, had MRI and did physical therapy course.  Chest symptoms seem to be radiating from left shoulder.  Advised compliance with  HEP.  Local ice/heat, alternate as needed.  Follow-up with orthopedic specialist if symptoms flare.  Naproxen twice daily as needed.  Take with food and discontinue if notes any GI side effects.  Topical analgesics as needed.    11. Right lateral epicondylitis  X-ray imaging normal.  Has seen Ortho and completed physical therapy.  Symptoms chronic.  Elbow or wrist brace as needed.  HEP.  Limit activities that exacerbate pain.  Prefers to avoid intra-articular injections.  Topical analgesics as needed.  Naproxen twice daily as needed as above    12.  Migraine without aura and without status migrainosus, not intractable.    Stable to improved.  No significant flareups.  Has chronic daily headache, which has been stable.  Defers any prophylactic medication.  Naproxen twice daily/as needed    13.  Visit for screening mammogram  No breast complaints.  - [facility] MARCELINO 2D+3D SCREENING BILAT (CPT=77067/33028); Future    14. Encounter for screening colonoscopy  Reviewed indication.  - GASTRO - INTERNAL    15. Encounter for medication monitoring  - TSH and Free T4; Future    Advised annual flu shot.  Patient defers, understanding risk of not being vaccinated.

## 2024-12-05 ENCOUNTER — TELEPHONE (OUTPATIENT)
Dept: PHYSICAL THERAPY | Age: 49
End: 2024-12-05

## 2024-12-06 NOTE — TELEPHONE ENCOUNTER
Received VM message from prior patient requesting call back to discuss exercises. Pt notes improvement shoulder but still elbow pain.  Called pt to discuss, referred back to MD for assess and to determine if more PT appropriate.

## 2025-01-11 ENCOUNTER — HOSPITAL ENCOUNTER (OUTPATIENT)
Dept: MAMMOGRAPHY | Age: 50
Discharge: HOME | End: 2025-01-11
Attending: FAMILY MEDICINE
Payer: COMMERCIAL

## 2025-01-11 ENCOUNTER — LAB ENCOUNTER (OUTPATIENT)
Dept: LAB | Age: 50
End: 2025-01-11
Attending: FAMILY MEDICINE
Payer: COMMERCIAL

## 2025-01-11 DIAGNOSIS — Z12.31 VISIT FOR SCREENING MAMMOGRAM: ICD-10-CM

## 2025-01-11 DIAGNOSIS — R73.03 PREDIABETES: ICD-10-CM

## 2025-01-11 DIAGNOSIS — E78.49 OTHER HYPERLIPIDEMIA: ICD-10-CM

## 2025-01-11 DIAGNOSIS — E89.0 POSTSURGICAL HYPOTHYROIDISM: ICD-10-CM

## 2025-01-11 DIAGNOSIS — Z51.81 ENCOUNTER FOR MEDICATION MONITORING: ICD-10-CM

## 2025-01-11 DIAGNOSIS — D72.818 OTHER DECREASED WHITE BLOOD CELL (WBC) COUNT: ICD-10-CM

## 2025-01-11 DIAGNOSIS — E53.8 FOLIC ACID DEFICIENCY: ICD-10-CM

## 2025-01-11 DIAGNOSIS — E55.9 VITAMIN D DEFICIENCY: ICD-10-CM

## 2025-01-11 LAB
ALBUMIN SERPL-MCNC: 4.3 G/DL (ref 3.2–4.8)
ALBUMIN/GLOB SERPL: 1.5 {RATIO} (ref 1–2)
ALP LIVER SERPL-CCNC: 54 U/L
ALT SERPL-CCNC: 17 U/L
ANION GAP SERPL CALC-SCNC: 6 MMOL/L (ref 0–18)
AST SERPL-CCNC: 14 U/L (ref ?–34)
BASOPHILS # BLD AUTO: 0.03 X10(3) UL (ref 0–0.2)
BASOPHILS NFR BLD AUTO: 0.8 %
BILIRUB SERPL-MCNC: 0.5 MG/DL (ref 0.3–1.2)
BUN BLD-MCNC: 17 MG/DL (ref 9–23)
CALCIUM BLD-MCNC: 9.1 MG/DL (ref 8.7–10.4)
CHLORIDE SERPL-SCNC: 107 MMOL/L (ref 98–112)
CHOLEST SERPL-MCNC: 191 MG/DL (ref ?–200)
CO2 SERPL-SCNC: 26 MMOL/L (ref 21–32)
CREAT BLD-MCNC: 0.95 MG/DL
EGFRCR SERPLBLD CKD-EPI 2021: 73 ML/MIN/1.73M2 (ref 60–?)
EOSINOPHIL # BLD AUTO: 0.08 X10(3) UL (ref 0–0.7)
EOSINOPHIL NFR BLD AUTO: 2.1 %
ERYTHROCYTE [DISTWIDTH] IN BLOOD BY AUTOMATED COUNT: 12.9 %
EST. AVERAGE GLUCOSE BLD GHB EST-MCNC: 131 MG/DL (ref 68–126)
FASTING PATIENT LIPID ANSWER: YES
FASTING STATUS PATIENT QL REPORTED: YES
FOLATE SERPL-MCNC: 19 NG/ML (ref 5.4–?)
GLOBULIN PLAS-MCNC: 2.9 G/DL (ref 2–3.5)
GLUCOSE BLD-MCNC: 99 MG/DL (ref 70–99)
HBA1C MFR BLD: 6.2 % (ref ?–5.7)
HCT VFR BLD AUTO: 38.4 %
HDLC SERPL-MCNC: 36 MG/DL (ref 40–59)
HGB BLD-MCNC: 13 G/DL
IMM GRANULOCYTES # BLD AUTO: 0.01 X10(3) UL (ref 0–1)
IMM GRANULOCYTES NFR BLD: 0.3 %
LDLC SERPL CALC-MCNC: 143 MG/DL (ref ?–100)
LYMPHOCYTES # BLD AUTO: 1.48 X10(3) UL (ref 1–4)
LYMPHOCYTES NFR BLD AUTO: 39.1 %
MCH RBC QN AUTO: 33.2 PG (ref 26–34)
MCHC RBC AUTO-ENTMCNC: 33.9 G/DL (ref 31–37)
MCV RBC AUTO: 98 FL
MONOCYTES # BLD AUTO: 0.28 X10(3) UL (ref 0.1–1)
MONOCYTES NFR BLD AUTO: 7.4 %
NEUTROPHILS # BLD AUTO: 1.91 X10 (3) UL (ref 1.5–7.7)
NEUTROPHILS # BLD AUTO: 1.91 X10(3) UL (ref 1.5–7.7)
NEUTROPHILS NFR BLD AUTO: 50.3 %
NONHDLC SERPL-MCNC: 155 MG/DL (ref ?–130)
OSMOLALITY SERPL CALC.SUM OF ELEC: 290 MOSM/KG (ref 275–295)
PLATELET # BLD AUTO: 244 10(3)UL (ref 150–450)
POTASSIUM SERPL-SCNC: 3.8 MMOL/L (ref 3.5–5.1)
PROT SERPL-MCNC: 7.2 G/DL (ref 5.7–8.2)
RBC # BLD AUTO: 3.92 X10(6)UL
SODIUM SERPL-SCNC: 139 MMOL/L (ref 136–145)
T4 FREE SERPL-MCNC: 1.3 NG/DL (ref 0.8–1.7)
TRIGL SERPL-MCNC: 67 MG/DL (ref 30–149)
TSI SER-ACNC: 2.22 UIU/ML (ref 0.55–4.78)
VIT D+METAB SERPL-MCNC: 36.3 NG/ML (ref 30–100)
VLDLC SERPL CALC-MCNC: 12 MG/DL (ref 0–30)
WBC # BLD AUTO: 3.8 X10(3) UL (ref 4–11)

## 2025-01-11 PROCEDURE — 84443 ASSAY THYROID STIM HORMONE: CPT

## 2025-01-11 PROCEDURE — 80053 COMPREHEN METABOLIC PANEL: CPT

## 2025-01-11 PROCEDURE — 77067 SCR MAMMO BI INCL CAD: CPT | Performed by: FAMILY MEDICINE

## 2025-01-11 PROCEDURE — 82306 VITAMIN D 25 HYDROXY: CPT

## 2025-01-11 PROCEDURE — 82746 ASSAY OF FOLIC ACID SERUM: CPT

## 2025-01-11 PROCEDURE — 80061 LIPID PANEL: CPT

## 2025-01-11 PROCEDURE — 83036 HEMOGLOBIN GLYCOSYLATED A1C: CPT

## 2025-01-11 PROCEDURE — 84439 ASSAY OF FREE THYROXINE: CPT

## 2025-01-11 PROCEDURE — 36415 COLL VENOUS BLD VENIPUNCTURE: CPT

## 2025-01-11 PROCEDURE — 85025 COMPLETE CBC W/AUTO DIFF WBC: CPT

## 2025-01-11 PROCEDURE — 77063 BREAST TOMOSYNTHESIS BI: CPT | Performed by: FAMILY MEDICINE

## 2025-02-18 ENCOUNTER — OFFICE VISIT (OUTPATIENT)
Dept: FAMILY MEDICINE CLINIC | Facility: CLINIC | Age: 50
End: 2025-02-18
Payer: COMMERCIAL

## 2025-02-18 VITALS
WEIGHT: 186.5 LBS | HEART RATE: 88 BPM | SYSTOLIC BLOOD PRESSURE: 120 MMHG | RESPIRATION RATE: 16 BRPM | BODY MASS INDEX: 33.04 KG/M2 | HEIGHT: 63 IN | DIASTOLIC BLOOD PRESSURE: 66 MMHG | OXYGEN SATURATION: 98 % | TEMPERATURE: 97 F

## 2025-02-18 DIAGNOSIS — E89.0 POSTSURGICAL HYPOTHYROIDISM: Primary | ICD-10-CM

## 2025-02-18 DIAGNOSIS — Z82.49 FAMILY HISTORY OF EARLY CAD: ICD-10-CM

## 2025-02-18 DIAGNOSIS — L71.9 ROSACEA: ICD-10-CM

## 2025-02-18 DIAGNOSIS — E55.9 VITAMIN D DEFICIENCY: ICD-10-CM

## 2025-02-18 DIAGNOSIS — E78.49 OTHER HYPERLIPIDEMIA: ICD-10-CM

## 2025-02-18 DIAGNOSIS — D72.818 OTHER DECREASED WHITE BLOOD CELL (WBC) COUNT: ICD-10-CM

## 2025-02-18 DIAGNOSIS — R73.03 PREDIABETES: ICD-10-CM

## 2025-02-18 DIAGNOSIS — Z82.49 FAMILY HISTORY OF ABDOMINAL AORTIC ANEURYSM (AAA): ICD-10-CM

## 2025-02-18 DIAGNOSIS — Z82.49 FAMILY HISTORY OF BLOOD CLOTS: ICD-10-CM

## 2025-02-18 DIAGNOSIS — E53.8 FOLIC ACID DEFICIENCY: ICD-10-CM

## 2025-02-18 DIAGNOSIS — J06.9 VIRAL UPPER RESPIRATORY TRACT INFECTION: ICD-10-CM

## 2025-02-18 DIAGNOSIS — R92.333 HETEROGENEOUSLY DENSE TISSUE OF BOTH BREASTS ON MAMMOGRAPHY: ICD-10-CM

## 2025-02-18 PROCEDURE — 99214 OFFICE O/P EST MOD 30 MIN: CPT | Performed by: FAMILY MEDICINE

## 2025-02-18 PROCEDURE — 3078F DIAST BP <80 MM HG: CPT | Performed by: FAMILY MEDICINE

## 2025-02-18 PROCEDURE — 3074F SYST BP LT 130 MM HG: CPT | Performed by: FAMILY MEDICINE

## 2025-02-18 PROCEDURE — 3008F BODY MASS INDEX DOCD: CPT | Performed by: FAMILY MEDICINE

## 2025-02-18 PROCEDURE — G2211 COMPLEX E/M VISIT ADD ON: HCPCS | Performed by: FAMILY MEDICINE

## 2025-02-19 NOTE — PROGRESS NOTES
Td Quintero is a 49 year old female.  HPI:  Pt is here for f/u on meds and lab results. .  Taking Levothyroxine daily.   Wt gain  No unusual fatigue.   No regular exercise/walking that she did in the summer months.   Menses usually regular.   Did not have menses in 12/2024 and then had it twice in January, in beg and end of month.  Normal flow  No intermenstrual bleeding  MGM with CVA  Mom with aortic aneurysm  Paternal aunt with CAD, age 50s  Denies chest pain, no shortness of breath, no palpitations.  For 2 days notes some nasal congeston  Temp 99.   Some chills  No cough  Runny nose /sneezing.   Dtr with similar sxs.  Stopped folic acid supplement  Taking multivitamin daily.    Took Rx VItamin D weekly for a few doses but did take complete course as prescribed  Saw Derm  Given Rx topical and caused dryness.  Some flare-up of acne  Switched to Metrondiazole 0.75% gel.  Using serum  To be scheduling screening colonoscopy, has to coordinate a ride to take her there.   No abdominal pain.  Normal bowel movements.  Intermittent headaches.     Current Outpatient Medications   Medication Sig Dispense Refill    naproxen 500 MG Oral Tab 1 po bid /prn, take with food 30 tablet 1    metroNIDAZOLE 1 % External Gel Apply to aa bid as directed 30 g 1    Multiple Vitamin (MULTIVITAMIN OR) Take by mouth daily.      ergocalciferol 1.25 MG (75927 UT) Oral Cap Take 1 capsule (50,000 Units total) by mouth once a week. 12 capsule 0    levothyroxine 88 MCG Oral Tab Take 1 tablet (88 mcg total) by mouth before breakfast. 90 tablet 3    Fexofenadine HCl (ALLEGRA ODT OR) Take 180 mg by mouth as needed.                         Past Medical History:    Acute maxillary sinusitis    Acute, but ill-defined, cerebrovascular disease    Allergic rhinitis    Anemia    Costochondritis    Dysphagia, unspecified(787.20)    History of abnormal Pap smear    s/p cryo    Leukocytopenia, unspecified    Lump or mass in breast    Migraine    Migraines     Nontoxic multinodular goiter    s/p thyroid bx  & 10/2008, both benign     Osteoarthrosis, unspecified whether generalized or localized, unspecified site    Right Knee, Spine    Other chest pain    Prediabetes    per pt.     TIA (transient ischemic attack)    Unspecified disorder of thyroid    MNG    Visual impairment    glasses as needed       Past Surgical History:   Procedure Laterality Date    Biopsy of thyroid,percut      benign    Bone marrow biopsy  207    benign    Hemorrhoidectomy  2013    Procedure: HEMORRHOIDECTOMY;  Surgeon: MALIK Allen MD;  Location: [facility] OR          Other surgical history      lap for ectopic after Delivery  with D&C    Other surgical history  10/2015    IR Cerebral Angiogram: No intracranial Aneurysm    Thyroidectomy  2020         Social History     Socioeconomic History    Marital status:    Tobacco Use    Smoking status: Never     Passive exposure: Never    Smokeless tobacco: Never   Vaping Use    Vaping status: Never Used   Substance and Sexual Activity    Alcohol use: Yes     Alcohol/week: 0.0 standard drinks of alcohol     Comment: rare    Drug use: No    Sexual activity: Yes     Partners: Male   Other Topics Concern    Caffeine Concern Yes     Comment: occasionally soda    Exercise Yes     Comment: walking    Seat Belt Yes     Social Drivers of Health     Food Insecurity: No Food Insecurity (2025)    NCSS - Food Insecurity     Worried About Running Out of Food in the Last Year: No     Ran Out of Food in the Last Year: No   Transportation Needs: No Transportation Needs (2025)    NCSS - Transportation     Lack of Transportation: No   Housing Stability: Not At Risk (2025)    NCSS - Housing/Utilities     Has Housing: Yes     Worried About Losing Housing: No     Unable to Get Utilities: No                   REVIEW OF SYSTEMS:  GENERAL HEALTH: feels well otherwise, mood is good  SKIN:as above  RESPIRATORY: denies shortness of breath with  exertion, no cough  CARDIOVASCULAR: denies chest pain on exertion  GI: denies abdominal pain and denies heartburn  : normal bladder  NEURO: denies headaches, denies dizziness    EXAM:  /66   Pulse 88   Temp 97.4 °F (36.3 °C) (Temporal)   Resp 16   Ht 5' 3\" (1.6 m)   Wt 186 lb 8 oz (84.6 kg)   LMP 01/21/2025 (Exact Date)   SpO2 98%   BMI 33.04 kg/m²   Wt Readings from Last 6 Encounters:   02/18/25 186 lb 8 oz (84.6 kg)   11/05/24 183 lb 5 oz (83.2 kg)   07/11/24 180 lb (81.6 kg)   06/27/24 180 lb (81.6 kg)   04/17/24 180 lb (81.6 kg)   01/26/24 180 lb (81.6 kg)     GENERAL: well developed, well nourished,in no apparent distress, pleasant affect  SKIN: decreased facial erythema/Rosacea changes of nose and medial cheeks  HEENT: atraumatic, normocephalic,  Conj clear  TMs:clear bilat  OMM, throat with mild erythema, clear postnasal drip, no exudates  NECK: supple,no adenopathy, healed anterior scar  LUNGS: clear to auscultation  CARDIO: RRR without murmur  GI: NABS, soft, no tenderness, no masses, no HSM, ND  EXTREMITIES: no cyanosis, clubbing or edema  NEURO: A&O, no focal deficits      ASSESSMENT AND PLAN:    1. Postsurgical hypothyroidism  Labs reviewed with normal TFTs.  Continue current levothyroxine dose.    2. Other hyperlipidemia  3. Family history of early CAD  Lipid panel with low HDL and elevated LDL at 143, higher than previous.  Triglycerides normal.  Reviewed diet/exercise/lipid goals.  Discussed risks of lipid abnormalities and indication for medications.  Patient prefers to avoid medication..  Advised CT calcium score.  Reviewed indication for testing.  If positive, would recommend statin treatment.  - CT CALCIUM SCORING; Future    4. Viral upper respiratory tract infection  Keep well-hydrated.  Tylenol/ibuprofen, alternate every 6 hours as needed.    Steam treatments as needed.  Saltwater gargles as needed.  Call or follow-up if symptoms persist or worsen.    5. Other decreased white  blood cell (WBC) count  White blood cell count 3.8, differential normal.  Improved compared to previous.  Hemoglobin and platelet counts normal.  Clinically benign.    6. Heterogeneously dense tissue of both breasts on mammography  Reviewed mammogram results with benign finding.  Because of breast density, advised may benefit from supplemental screening.  Check bilateral breast ultrasound as ordered.  - US BREAST BILATERAL COMPLETE (CPT=76641-50); Future    7. Family history of abdominal aortic aneurysm (AAA)  8. Family history of blood clots  - US ABDOMEN COMPLETE (CPT=76700); Future    9. Rosacea  Clinically with some improvement.  Continue skin care/topicals per dermatology    10.  Prediabetes   Fasting blood sugar normal.  Hemoglobin A1c 6.2, improved compared to previous.  Reviewed diet/exercise.  Monitor.    11.  Vitamin D deficiency  Vitamin D level significantly improved and now normal.  Advised can take 50,000 units every 2 weeks that she has at home, and then can switch to vitamin D3 OTC 2000 units daily    11.  Folic acid deficiency  Off supplement now.  Folic acid level is now normal.  Continue MVI daily.

## 2025-04-13 ENCOUNTER — HOSPITAL ENCOUNTER (OUTPATIENT)
Dept: CT IMAGING | Age: 50
Discharge: HOME | End: 2025-04-13
Attending: FAMILY MEDICINE

## 2025-04-13 DIAGNOSIS — E78.49 OTHER HYPERLIPIDEMIA: ICD-10-CM

## 2025-04-13 DIAGNOSIS — Z82.49 FAMILY HISTORY OF EARLY CAD: ICD-10-CM

## 2025-04-21 ENCOUNTER — TELEPHONE (OUTPATIENT)
Dept: FAMILY MEDICINE CLINIC | Facility: CLINIC | Age: 50
End: 2025-04-21

## 2025-04-21 NOTE — TELEPHONE ENCOUNTER
Patient stopped in to speak with a nurse.  Patient was just prescribed Doxycycline 100 mg by her dermatologist and patient would like to know will this medication interfere with taking  - levothyroxine 88 MCG Oral Tab, because one medication has to be taken on an empty stomach and the other medication taken after patient has eaten.

## 2025-04-22 NOTE — TELEPHONE ENCOUNTER
Left generic message for patient to call back and ask to speak with nurse, office number provided.

## 2025-04-22 NOTE — TELEPHONE ENCOUNTER
Patient returned call. Advised patient (per UpToDate) no drug interaction between Doxycycline and Levothyroxine. Confirmed Levothyroxine should be taken on an empty stomach, 30-60 minutes prior to food or caffeine. Once she eats breakfast, she can take Doxy with food. Per patient, antibiotic was prescribed twice daily so advised patient to space out as close as possible to every 12 hours. Patient reports she will be taking Doxycycline for one month and symptoms will be reassessed at that time.

## 2025-04-23 ENCOUNTER — TELEPHONE (OUTPATIENT)
Dept: INTERNAL MEDICINE CLINIC | Facility: CLINIC | Age: 50
End: 2025-04-23

## 2025-04-23 NOTE — TELEPHONE ENCOUNTER
Please notify patient CT scan with coronary artery calcium score: 0, Normal, indicating  No identifiable calcified plaque.  CT calcium scoring over read with  incidental finding of discoid atelectasis in the right middle lobe.  Atelectasis is a condition where lung tissue collapses leading to decrease in volume and airspace.  This is usually caused by mucous plugs or inflammation, or scarring from previous infections.   Deep breathing exercises should help.  Patient is due for routine physical.  Please schedule appointment and will also review result details at that time. may consider repeat imaging in a few months to insure stability.

## 2025-04-23 NOTE — TELEPHONE ENCOUNTER
Patient called asking for the CT calcium score results.    Dr. Bautista - please review CT calcium score results

## 2025-04-24 NOTE — TELEPHONE ENCOUNTER
Attempted to call patient (no identifiers on voicemail), left message to call the office to speak with a nurse.

## 2025-04-24 NOTE — TELEPHONE ENCOUNTER
Triage call transferred.   Spoke with pt to inform CT results as per PCP. Pt inquiring if this is why pt has been feeling chest pain when takes deep breaths or with certain movements. Per pt, PCP had discussed possibly costochondritis and that's why PCP ordered work-up. Pt will try the deep breathing exercises.   Pt inquiring how long will pt have this pain?  Offered to schedule CPE- pt declined at this time, as would like to complete scheduled abd US and f/u after to discuss all results.   Informed will relay to PCP update and for further recommendations. Pt verbalized understanding and agreed with POC.    Please advise. Thank you.

## 2025-04-25 NOTE — TELEPHONE ENCOUNTER
Chest sxs likely not related to atelectasis, but atelectasis may be triggered by lack of deep breathing due to the chest soreness.   Should do deep breathing exercises the best she can and will monitor.   Should schedule appt for f/u after abd ultrasound to re-evaluate in office and make further recs.

## 2025-04-28 NOTE — TELEPHONE ENCOUNTER
Patient called back, informed of  recommendation. States that she will call back to make follow up appointment after ultrasound.

## 2025-05-04 ENCOUNTER — HOSPITAL ENCOUNTER (OUTPATIENT)
Dept: ULTRASOUND IMAGING | Age: 50
Discharge: HOME | End: 2025-05-04
Attending: FAMILY MEDICINE
Payer: COMMERCIAL

## 2025-05-04 ENCOUNTER — HOSPITAL ENCOUNTER (OUTPATIENT)
Dept: ULTRASOUND IMAGING | Age: 50
End: 2025-05-04
Attending: FAMILY MEDICINE
Payer: COMMERCIAL

## 2025-05-04 DIAGNOSIS — Z82.49 FAMILY HISTORY OF ABDOMINAL AORTIC ANEURYSM (AAA): ICD-10-CM

## 2025-05-04 DIAGNOSIS — Z82.49 FAMILY HISTORY OF BLOOD CLOTS: ICD-10-CM

## 2025-05-04 PROCEDURE — 76700 US EXAM ABDOM COMPLETE: CPT | Performed by: FAMILY MEDICINE

## 2025-05-22 NOTE — TELEPHONE ENCOUNTER
Patient called to check on status of medication   metroNIDAZOLE 1 % External Gel   Advised that medication is processing and to allow 48 to 72 hours for processing .

## 2025-05-23 RX ORDER — METRONIDAZOLE 10 MG/G
1 GEL TOPICAL 2 TIMES DAILY
Qty: 60 G | Refills: 1 | Status: SHIPPED | OUTPATIENT
Start: 2025-05-23

## 2025-06-11 ENCOUNTER — OFFICE VISIT (OUTPATIENT)
Dept: FAMILY MEDICINE CLINIC | Facility: CLINIC | Age: 50
End: 2025-06-11
Payer: COMMERCIAL

## 2025-06-11 VITALS
SYSTOLIC BLOOD PRESSURE: 120 MMHG | HEART RATE: 95 BPM | TEMPERATURE: 98 F | BODY MASS INDEX: 32.43 KG/M2 | OXYGEN SATURATION: 98 % | DIASTOLIC BLOOD PRESSURE: 80 MMHG | RESPIRATION RATE: 18 BRPM | WEIGHT: 183 LBS | HEIGHT: 63 IN

## 2025-06-11 DIAGNOSIS — R73.03 PREDIABETES: ICD-10-CM

## 2025-06-11 DIAGNOSIS — R23.2 HOT FLASHES: Primary | ICD-10-CM

## 2025-06-11 DIAGNOSIS — M77.8 TENDINITIS OF LEFT SHOULDER: ICD-10-CM

## 2025-06-11 DIAGNOSIS — N28.1 CYST OF LEFT KIDNEY: ICD-10-CM

## 2025-06-11 DIAGNOSIS — G43.009 MIGRAINE WITHOUT AURA AND WITHOUT STATUS MIGRAINOSUS, NOT INTRACTABLE: ICD-10-CM

## 2025-06-11 DIAGNOSIS — R07.89 MUSCULOSKELETAL CHEST PAIN: ICD-10-CM

## 2025-06-11 DIAGNOSIS — M25.521 CHRONIC PAIN OF RIGHT ELBOW: ICD-10-CM

## 2025-06-11 DIAGNOSIS — Z00.00 LABORATORY EXAMINATION ORDERED AS PART OF A COMPLETE PHYSICAL EXAMINATION: ICD-10-CM

## 2025-06-11 DIAGNOSIS — N95.1 PERIMENOPAUSE: ICD-10-CM

## 2025-06-11 DIAGNOSIS — D72.818 OTHER DECREASED WHITE BLOOD CELL (WBC) COUNT: ICD-10-CM

## 2025-06-11 DIAGNOSIS — M94.0 COSTOCHONDRITIS: ICD-10-CM

## 2025-06-11 DIAGNOSIS — E78.49 OTHER HYPERLIPIDEMIA: ICD-10-CM

## 2025-06-11 DIAGNOSIS — G89.29 CHRONIC PAIN OF RIGHT ELBOW: ICD-10-CM

## 2025-06-11 DIAGNOSIS — R52 WHOLE BODY PAIN: ICD-10-CM

## 2025-06-11 DIAGNOSIS — J98.11 ATELECTASIS, RIGHT: ICD-10-CM

## 2025-06-11 DIAGNOSIS — E89.0 POSTSURGICAL HYPOTHYROIDISM: ICD-10-CM

## 2025-06-11 DIAGNOSIS — R51.9 CHRONIC DAILY HEADACHE: ICD-10-CM

## 2025-06-11 RX ORDER — DOXYCYCLINE 100 MG/1
100 CAPSULE ORAL 2 TIMES DAILY WITH MEALS
COMMUNITY
Start: 2025-04-23

## 2025-06-11 RX ORDER — TIZANIDINE 2 MG/1
TABLET ORAL
Qty: 20 TABLET | Refills: 0 | Status: SHIPPED | OUTPATIENT
Start: 2025-06-11

## 2025-06-11 RX ORDER — MELOXICAM 15 MG/1
TABLET ORAL
Qty: 20 TABLET | Refills: 0 | Status: SHIPPED | OUTPATIENT
Start: 2025-06-11

## 2025-06-11 RX ORDER — RIZATRIPTAN BENZOATE 10 MG/1
TABLET, ORALLY DISINTEGRATING ORAL
Qty: 12 TABLET | Refills: 2 | Status: SHIPPED | OUTPATIENT
Start: 2025-06-11

## 2025-06-11 NOTE — PROGRESS NOTES
Td Quintero is a 49 year old female.  HPI:  Pt is here for f/u on test results and has multiple issues to discuss.  Had acute hot flash associated with some dizziness and \"foggy brain\" sensation towards the end of her last menstrual period few days ago.  Since then has been having intermittent hot flashes.  Also notes whole body pain from head to balls of feet since this episode.  Feels a constant achy body pain.  Also had migraine headache at the time.  Menses have been regular.  LMP 5/29/2025.  Normal flow.  Lasting about 5 days.  No intermenstrual bleeding.  Usually gets very tired 2 weeks before menses, and this is not unusual, but other symptoms of hot flash and dizziness are new.  Symptoms lasted a few hours, then resolved, but since then has not been feeling good overall with generalized body pains.  Has chronic daily headaches for years.  Has seen neurology in the past.  Intermittent migraine headaches.  Was prescribed rizatriptan by neurologist in the past, but not did not take.  Migraine will last few days.  Requesting refill for rizatriptan and willing to try now.  No numbness or tingling.  No vision changes.  Continues to have significant upper sternal/chest pain that she has had for at least a couple of years.  Area feels sore, and symptoms worse with deep breathing.  No cough.  No fevers or chills.  Also with chronic left shoulder pain with limitations in movement due to pain  Also with some chronic right elbow greater than left elbow pain.  No joint swelling.  Shoulder and elbow symptoms started in about 2023 when was going downstairs carrying wooden slabs for bed.  Since then, persistent pain.  Has seen orthopedic/sports medicine specialist.  Had MRI of shoulder.  Considering injection.  Did PT course.  Does some HEP.  Was prescribed anti-inflammatory and muscle relaxant in the past, but did not take.  Frustrated with chronic pain.  Does go about her activities, but has chronic  pain.      Current Outpatient Medications   Medication Sig Dispense Refill    doxycycline 100 MG Oral Cap Take 1 capsule (100 mg total) by mouth 2 (two) times daily with meals.      metroNIDAZOLE 1 % External Gel APPLY TO THE AFFECTED AREA TWICE DAILY AS DIRECTED 60 g 1    naproxen 500 MG Oral Tab 1 po bid /prn, take with food 30 tablet 1    Multiple Vitamin (MULTIVITAMIN OR) Take by mouth daily.      ergocalciferol 1.25 MG (45190 UT) Oral Cap Take 1 capsule (50,000 Units total) by mouth once a week. 12 capsule 0    levothyroxine 88 MCG Oral Tab Take 1 tablet (88 mcg total) by mouth before breakfast. 90 tablet 3    Fexofenadine HCl (ALLEGRA ODT OR) Take 180 mg by mouth as needed.           Past Medical History:    Acute maxillary sinusitis    Acute, but ill-defined, cerebrovascular disease    Allergic rhinitis    Anemia    Costochondritis    Dysphagia, unspecified(787.20)    History of abnormal Pap smear    s/p cryo    Leukocytopenia, unspecified    Lump or mass in breast    Migraine    Migraines    Nontoxic multinodular goiter    s/p thyroid bx  & 10/2008, both benign     Osteoarthrosis, unspecified whether generalized or localized, unspecified site    Right Knee, Spine    Other chest pain    Prediabetes    per pt.     TIA (transient ischemic attack)    Unspecified disorder of thyroid    MNG    Visual impairment    glasses as needed       Past Surgical History:   Procedure Laterality Date    Biopsy of thyroid,percut      benign    Bone marrow biopsy      benign    Hemorrhoidectomy  2013    Procedure: HEMORRHOIDECTOMY;  Surgeon: MALIK Allen MD;  Location: [facility] MAIN OR          Other surgical history      lap for ectopic after Delivery  with D&C    Other surgical history  10/2015    IR Cerebral Angiogram: No intracranial Aneurysm    Thyroidectomy  2020         Social History     Socioeconomic History    Marital status:    Tobacco Use    Smoking status: Never     Passive exposure: Never     Smokeless tobacco: Never   Vaping Use    Vaping status: Never Used   Substance and Sexual Activity    Alcohol use: Yes     Alcohol/week: 0.0 standard drinks of alcohol     Comment: rare    Drug use: No    Sexual activity: Yes     Partners: Male   Other Topics Concern    Caffeine Concern Yes     Comment: occasionally soda    Exercise Yes     Comment: walking    Seat Belt Yes     Social Drivers of Health     Food Insecurity: No Food Insecurity (2/18/2025)    NCSS - Food Insecurity     Worried About Running Out of Food in the Last Year: No     Ran Out of Food in the Last Year: No   Transportation Needs: No Transportation Needs (2/18/2025)    NCSS - Transportation     Lack of Transportation: No   Housing Stability: Not At Risk (2/18/2025)    NCSS - Housing/Utilities     Has Housing: Yes     Worried About Losing Housing: No     Unable to Get Utilities: No                   REVIEW OF SYSTEMS:  GENERAL HEALTH: feels well otherwise, mood is good  SKIN: denies any unusual skin lesions or rashes  RESPIRATORY: denies shortness of breath with exertion, no cough  CARDIOVASCULAR: denies chest pain on exertion.  Chest soreness/tenderness as above  GI: denies abdominal pain and denies heartburn  : normal bladder  NEURO: As above    EXAM:  /80   Pulse 95   Temp 97.8 °F (36.6 °C) (Temporal)   Resp 18   Ht 5' 3\" (1.6 m)   Wt 183 lb (83 kg)   LMP 01/21/2025 (Exact Date)   SpO2 98%   BMI 32.42 kg/m²   Wt Readings from Last 6 Encounters:   06/11/25 183 lb (83 kg)   02/18/25 186 lb 8 oz (84.6 kg)   11/05/24 183 lb 5 oz (83.2 kg)   07/11/24 180 lb (81.6 kg)   06/27/24 180 lb (81.6 kg)   04/17/24 180 lb (81.6 kg)     GENERAL: well developed, well nourished,in no apparent distress, pleasant affect  SKIN: No facial erythema.  No active rosacea lesions noted  HEENT: atraumatic, normocephalic,  Conj clear, EOMI  NECK: supple,no adenopathy, no masses.  Healed anterior scar.  No spinal tenderness.  Mild.  Cervical tenderness.   Full range of motion.  LUNGS: clear to auscultation  CARDIO: RRR without murmur, normal S1, S2, no ectopy  Tenderness over chest wall anteriorly, left greater than right.  GI: NABS, soft, no tenderness, no masses, no HSM, ND  EXTREMITIES: no cyanosis, clubbing or edema  Left shoulder with some anterolateral tenderness.  Good range of motion with pain with abduction and internal rotation.  Right elbow with lateral tenderness.  Full range of motion.  No swelling.  NEURO: A&O x3, no focal deficits  Normal gait    ASSESSMENT AND PLAN:    1. Hot flashes  2. Perimenopause  Clinically perimenopausal symptoms.  Advised to keep well-hydrated, regular/balanced meals.  Regular exercise.  Good sleep hygiene.  Discussed indication/risks of HRT.  Discussed OTC treatment options.  Monitor.    3. Whole body pain  4. Costochondritis  5. Musculoskeletal chest pain  Patient with multiple joint/muscle symptoms.  Previous rheumatoid arthritis testing negative with normal sed rate and CRP.  Vitamin D level significantly improved, and normal 1/2025.  Completed prescription vitamin D supplement weekly and to be taking vitamin D3 2000 units daily for maintenance.  In light of chronic pain, recommend treatment with anti-inflammatory and muscle relaxant.  Discontinue naproxen.  Meloxicam 15 mg daily in a.m. for 1 week, then 1 tablet daily as needed.  Take with food, and discontinue if notes any GI side effects.  Tizanidine 2 mg nightly for 1 week, then nightly as needed.  Warned of possible sedation.  Advised gentle HEP.  Local ice/heat for affected areas as needed.  Topical analgesics as needed.  Monitor.    6. Postsurgical hypothyroidism  Continue current levothyroxine dose.  Monitor TFTs  - TSH and Free T4; Future    7. Other hyperlipidemia  Reviewed diet and exercise.  LDL elevated with previous labs, 143.  HDL is slightly low at 36.  Normal triglycerides.  Monitor with therapeutic lifestyle changes.  CAC: 0, consistent with no  identifiable calcified plaque.  Reassured.  - Comp Metabolic Panel (14); Future  - Lipid Panel; Future    8. Prediabetes  Monitor with diet and exercise.  Last A1c from 1/2025: 6.2  - Comp Metabolic Panel (14); Future  - Hemoglobin A1C; Future    9. Atelectasis, right  Noted CT calcium score over read with findings of discoid atelectasis in the right middle lobe.  Clinically related to possible shallow breathing due to chronic musculoskeletal chest pain/costochondritis.  Encourage deep breathing exercises while awake.  Monitor for any respiratory symptoms.  Consider recheck imaging in 6-12 months    10. Cyst of left kidney  Noted incidental finding on abdominal ultrasound with 3.6 cm cyst, superior pole left kidney.  Clinically benign.  monitor  - Urinalysis, Routine; Future    11. Chronic daily headache  Advised good sleep hygiene.  Defers maintenance medication.  Symptoms different than her migraines.  Normal neuro exam  Normal brain MRI in 2022.  Follow-up if notes increased frequency or intensity.    12. Tendinitis of left shoulder  Patient with chronic left shoulder symptoms.  Seeing orthopedic specialist.  Had MRI with extrinsic rotator cuff impingement in the left shoulder with mild supraspinatus tendinopathy and AC arthropathy.  Has done physical therapy.  Encouraged compliance with HEP.  Limit activities that exacerbate pain.  Meloxicam and tizanidine as above should help.  Consider intra-articular injection or Ortho follow-up if not improved.    13. Chronic pain of right elbow  Clinically with lateral epicondylitis.  Did PT.  Advise regular HEP  Avoid activities that exacerbate pain.  Elbow brace as needed  - Ortho Referral - In Network    14. Migraine without aura and without status migrainosus, not intractable  Avoid possible dietary triggers.  Good sleep hygiene.  Has seen neurologist in the past.  Was prescribed rizatriptan, but did not use.  Would like another prescription for rizatriptan 10 mg and  will take as needed at onset of migraine headache, may repeat in 2 hours if needed.  Max 2/day  Discussed poss SEs. Monitor    15. Other decreased white blood cell (WBC) count  Chronic, intermittent, benign.  Has seen hematology in the past.  Previous bone marrow bx normal.  Monitor.  - CBC With Differential With Platelet; Future    16. Laboratory examination ordered as part of a complete physical examination  - CBC With Differential With Platelet; Future  - Comp Metabolic Panel (14); Future  - Lipid Panel; Future  - TSH and Free T4; Future  - Hemoglobin A1C; Future

## (undated) DIAGNOSIS — E89.0 POSTSURGICAL HYPOTHYROIDISM: ICD-10-CM

## (undated) DEVICE — SUTURE SILK 2-0 X-1

## (undated) DEVICE — SUTURE SILK 2-0

## (undated) DEVICE — KENDALL SCD EXPRESS SLEEVES, KNEE LENGTH, MEDIUM: Brand: KENDALL SCD

## (undated) DEVICE — SUTURE SILK 3-0

## (undated) DEVICE — 3M™ STERI-STRIP™ REINFORCED ADHESIVE SKIN CLOSURES, R1547, 1/2 IN X 4 IN (12 MM X 100 MM), 6 STRIPS/ENVELOPE: Brand: 3M™ STERI-STRIP™

## (undated) DEVICE — GAUZE SPONGES,USP TYPE VII GAUZE, 12 PLY: Brand: CURITY

## (undated) DEVICE — SOL  .9 1000ML BTL

## (undated) DEVICE — CAUTERY NEEDLE 2IN INS E1465

## (undated) DEVICE — AIRWAY ENDO  TRIVANTAGE 7MM

## (undated) DEVICE — HARMONIC FOCUS SHEARS 9CM LENGTH + ADAPTIVE TISSUE TECHNOLOGY FOR USE WITH BLUE HAND PIECE ONLY: Brand: HARMONIC FOCUS

## (undated) DEVICE — HEAD AND NECK CDS-LF: Brand: MEDLINE INDUSTRIES, INC.

## (undated) DEVICE — STERILE POLYISOPRENE POWDER-FREE SURGICAL GLOVES: Brand: PROTEXIS

## (undated) DEVICE — SPONGE: SPECIALTY PEANUT XR 100/CS: Brand: MEDICAL ACTION INDUSTRIES

## (undated) DEVICE — SUTURE VICRYL 3-0 SH

## (undated) DEVICE — 3M™ TEGADERM™ TRANSPARENT FILM DRESSING, 1626W, 4 IN X 4-3/4 IN (10 CM X 12 CM), 50 EACH/CARTON, 4 CARTON/CASE: Brand: 3M™ TEGADERM™

## (undated) DEVICE — PROBE 8225101 5PK STD PRASS FL TIP ROHS

## (undated) DEVICE — UNDYED BRAIDED (POLYGLACTIN 910), SYNTHETIC ABSORBABLE SUTURE: Brand: COATED VICRYL

## (undated) DEVICE — SUTURE PROLENE 4-0 PS-2

## (undated) DEVICE — ARISTA 1 GRAM

## (undated) DEVICE — PAD SACRAL SPAN AID

## (undated) NOTE — Clinical Note
02/27/2017        [facility]      Dear Jose E Gan,    [address] [facility] records indicate that you have outstanding lab work and or testing that was ordered for you and has not yet been completed:      CBC W Differential W Platelet

## (undated) NOTE — LETTER
04/22/21        [address]  [place] [address]      Dear Ganesh Mcgregor,    Our records indicate that you have outstanding lab work and or testing that was ordered for you and has not yet been completed:  Orders Placed This Encounter

## (undated) NOTE — LETTER
[facility] Testing Department  Phone: [phone]  Right Fax: [phone]  [facility] By:  Cristo Castillo RN Date: 2/18/20    Patient Name: Jeronimo Mulu  Surgery Date: 2/20/2020    CSN: [id number]  Medical Record:

## (undated) NOTE — LETTER
03/30/18        [address]      Dear Mehdi Shen,    [facility] records indicate that you have outstanding lab work and or testing that was ordered for you and has not yet been completed:          CBC ALEJANDRA Wu

## (undated) NOTE — LETTER
June 6, 2018    [address]      Dear Thee Jernigan: The following are the results of your recent tests. Please review the list of test results.   Your result is the value on the left; we have also supplied the range o VLDL 14 5 - 40 mg/dL   Chol/HDL Ratio 4.84 (H) <4.44   Non HDL Chol 146 (H) <130 mg/dL   -HEMOGLOBIN A1C   Result Value Ref Range   HgbA1C 5.9 (H) <5.7 %   Estimated Average Glucose 123 68 - 126 mg/dL   -TSH+FREE T4   Result Value Ref Range   Free T4 0.9 0

## (undated) NOTE — LETTER
06/16/21        [address]  [place] [address]      Dear Thee Jernigan,    Our records indicate that you have outstanding lab work and or testing that was ordered for you and has not yet been completed:  Orders Placed This Encounter

## (undated) NOTE — LETTER
07/31/20        [address]  [place] [address]      Dear Anthony Castillo,    [facility] records indicate that you have outstanding lab work and or testing that was ordered for you and has not yet been completed:  Orders Placed This Encounter      TS

## (undated) NOTE — LETTER
03/18/21        [address]  [place] [address]      Dear Ness Head,    Our records indicate that you have outstanding lab work and or testing that was ordered for you and has not yet been completed:  Orders Placed This Encounter